# Patient Record
Sex: MALE | Race: WHITE | Employment: OTHER | URBAN - METROPOLITAN AREA
[De-identification: names, ages, dates, MRNs, and addresses within clinical notes are randomized per-mention and may not be internally consistent; named-entity substitution may affect disease eponyms.]

---

## 2018-08-04 ENCOUNTER — HOSPITAL ENCOUNTER (OUTPATIENT)
Dept: MRI IMAGING | Age: 78
Discharge: HOME | End: 2018-08-04
Attending: PHYSICAL MEDICINE & REHABILITATION
Payer: MEDICARE

## 2018-08-04 DIAGNOSIS — M47.22 CERVICAL SPONDYLOSIS WITH RADICULOPATHY: ICD-10-CM

## 2018-08-04 DIAGNOSIS — M43.12 SPONDYLOLISTHESIS OF CERVICAL REGION: ICD-10-CM

## 2018-08-04 PROCEDURE — 72141 MRI NECK SPINE W/O DYE: CPT

## 2018-08-20 ENCOUNTER — OFFICE VISIT (OUTPATIENT)
Dept: CARDIOLOGY CLINIC | Age: 78
End: 2018-08-20

## 2018-08-20 VITALS
WEIGHT: 172 LBS | OXYGEN SATURATION: 99 % | HEIGHT: 67 IN | BODY MASS INDEX: 27 KG/M2 | RESPIRATION RATE: 16 BRPM | SYSTOLIC BLOOD PRESSURE: 120 MMHG | DIASTOLIC BLOOD PRESSURE: 88 MMHG | HEART RATE: 62 BPM

## 2018-08-20 DIAGNOSIS — R07.9 CHEST PAIN, UNSPECIFIED TYPE: Primary | ICD-10-CM

## 2018-08-20 DIAGNOSIS — I10 ESSENTIAL HYPERTENSION: ICD-10-CM

## 2018-08-20 RX ORDER — GLUCOSAMINE/CHONDR SU A SOD 750-600 MG
1 TABLET ORAL DAILY
COMMUNITY

## 2018-08-20 RX ORDER — PSEUDOEPH/DM/GUAIFEN/ACETAMIN 30-10-324
1 EXPECTORANT ORAL DAILY
COMMUNITY

## 2018-08-20 NOTE — PROGRESS NOTES
Chief Complaint   Patient presents with    Chest Pain    New Patient     1. Have you been to the ER, urgent care clinic since your last visit? Hospitalized since your last visit? No    2. Have you seen or consulted any other health care providers outside of the [address] [place] [address] since your last visit? Include any pap smears or colon screening.  No    Visit Vitals    /88 (BP 1 Location: Left arm, BP Patient Position: Sitting)    Pulse 62    Resp 16    Ht 5' 7\" (1.702 m)    Wt 172 lb (78 kg)    SpO2 99%    BMI 26.94 kg/m2

## 2018-08-20 NOTE — MR AVS SNAPSHOT
[address] [facility] [address] [facility] 
[phone] Patient: Alcide Holstein MRN: [id number] FIN:[birth year] Visit Information Date & Time Provider Department Dept. Phone Encounter #  
 8/20/2018  3:40 PM Benita Frazier MD [facility] Jey Reid [phone] [id number] Upcoming Health Maintenance Date Due DTaP/Tdap/Td series (1 - Tdap) 6/5/1961 ZOSTER VACCINE AGE 60> 4/5/2000 GLAUCOMA SCREENING Q2Y 6/5/2005 Pneumococcal 65+ Low/Medium Risk (2 of 2 - PCV13) 3/21/2015 Influenza Age 5 to Adult 8/1/2018 Allergies as of 8/20/2018  Review Complete On: 8/20/2018 By: Star Vidal LPN No Known Allergies Current Immunizations  Reviewed on 8/20/2018 Name Date Pneumococcal Polysaccharide (PPSV-23) 3/21/2014  9:12 AM  
  
 Reviewed by Star Vidal LPN on 5/85/3340 at  3:30 PM  
You Were Diagnosed With   
  
 Codes Comments Chest pain, unspecified type    -  Primary ICD-10-CM: R07.9 ICD-9-CM: 786.50 Essential hypertension     ICD-10-CM: I10 
ICD-9-CM: 401.9 Vitals BP Pulse Resp Height(growth percentile) Weight(growth percentile) SpO2  
 120/88 (BP 1 Location: Left arm, BP Patient Position: Sitting) 62 16 5' 7\" (1.702 m) 172 lb (78 kg) 99% BMI Smoking Status 26.94 kg/m2 Never Smoker Vitals History BMI and BSA Data Body Mass Index Body Surface Area  
 26.94 kg/m 2 1.92 m 2 Preferred Pharmacy Pharmacy Name Phone Loretta Lopez [facility], [address] [phone] Your Updated Medication List  
  
   
This list is accurate as of 8/20/18  4:27 PM.  Always use your most recent med list.  
  
  
  
  
 aspirin 81 mg chewable tablet Take 81 mg by mouth daily. Biotin 2,500 mcg Cap Take 1 Tab by mouth daily. CO Q-10 100 mg capsule Generic drug:  co-enzyme Q-10  
 Take 100 mg by mouth daily. FISH OIL 1,000 mg Cap Generic drug:  omega-3 fatty acids-vitamin e Take 1 Cap by mouth daily. FLOMAX 0.4 mg capsule Generic drug:  tamsulosin Take 0.4 mg by mouth daily. HYZAAR 100-25 mg per tablet Generic drug:  losartan-hydroCHLOROthiazide Take 1 Tab by mouth daily. L-ARGININE(ALPHA-KETOGLUTARAT) 350 mg Denzel Price Generic drug:  arginine oxoglurate Take 1 Tab by mouth daily. lutein 20 mg Cap Take 1 Tab by mouth daily. MOVE FREE [facility] 750 mg-100 mg- 1.65 mg-108 mg Tab Generic drug:  glucosam-chond-hyalu-CF borate Take 1 Tab by mouth daily. multivits,Stress Formula-Zinc tablet Take 1 Tab by mouth daily. Omeprazole Magnesium 20 mg Cpdr  
Take 20 mg by mouth daily. OTHER Beta Carotine (unknown dose) daily, Terry Ginseng Root (unknown dose) daily, Saw Palmetto (unknown dose) one daily OTHER Take 1 Tab by mouth daily. Tumeric  
  
 pravastatin 40 mg tablet Commonly known as:  PRAVACHOL Take 40 mg by mouth nightly. PROSCAR 5 mg tablet Generic drug:  finasteride Take 5 mg by mouth daily. VITAMIN C 500 mg tablet Generic drug:  ascorbic acid (vitamin C) Take 500 mg by mouth daily. vitamin E 400 unit capsule Commonly known as:  [address] Take 400 Units by mouth daily. We Performed the Following AMB POC EKG ROUTINE W/ 12 LEADS, INTER & REP [23623 CPT(R)] Introducing [facility]! [employer] introduces WorthPoint patient portal. Now you can access parts of your medical record, email your doctor's office, and request medication refills online. 1. In your internet browser, go to [email or web address] 2. Click on the First Time User? Click Here link in the Sign In box. You will see the New Member Sign Up page. 3. Enter your WorthPoint Access Code exactly as it appears below.  You will not need to use this code after youve completed the sign-up process. If you do not sign up before the expiration date, you must request a new code. · Mobibeam Access Code: [email or web address] Expires: 10/18/2018  2:38 PM 
 
4. Enter the last four digits of your Social Security Number (xxxx) and Date of Birth (mm/dd/yyyy) as indicated and click Submit. You will be taken to the next sign-up page. 5. Create a Mobibeam ID. This will be your Mobibeam login ID and cannot be changed, so think of one that is secure and easy to remember. 6. Create a Mobibeam password. You can change your password at any time. 7. Enter your Password Reset Question and Answer. This can be used at a later time if you forget your password. 8. Enter your e-mail address. You will receive e-mail notification when new information is available in [address]. 9. Click Sign Up. You can now view and download portions of your medical record. 10. Click the Download Summary menu link to download a portable copy of your medical information. If you have questions, please visit the Frequently Asked Questions section of the Mobibeam website. Remember, Mobibeam is NOT to be used for urgent needs. For medical emergencies, dial 911. Now available from your iPhone and Android! Please provide this summary of care documentation to your next provider. Your primary care clinician is listed as Mayi Hitchcock. If you have any questions after today's visit, please call [phone].

## 2018-08-20 NOTE — PROGRESS NOTES
Rebekah Andrade, [address]  [address],  [address]  [place], [address] [facility]    Office [phone]  Fax [phone]  Cell [phone]    Mitzy German is a 66 y.o. male referred for evaluation of chest pain. Consult requested by Dr. Hernandez from the [place] (a patient of mine)    Assessment  Encounter Diagnoses   Name Primary?  Chest pain, unspecified type Yes    Essential hypertension        Recommendations:  Atypical chest pain. His sxs are more consistent with GERD. Nevertheless, he is at intermediate CAD risk. Will evaluate further with stress echo. Discussed the role of CT heart scan if his stress test is normal.    HTN. Controlled on Hyzaar. He looks much younger than his age, and leads a fairly vigorous lifestyle. Phone follow up after reviewing tests      Follow-up Disposition: Not on File     Subjective:  Mr. Pamela Huang has a history of chronic HTN, but no previous cardiac history otherwise. He reports a 3 week history of intermittent right-sided chest tightness, which usually occurs after he eats. He states pain lasts a few minutes, then resolves on its own. He enjoys biking and working out 3-4x/week, with no exertional symptoms. However, he had some SOB when dancing with his partner a few weeks ago. He has a history PUD and has been on PPI therapy. He also reports pain related to a pinched nerve. 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     Patient denies any dyspnea, palpitations, syncope, orthopnea, edema or paroxysmal nocturnal dyspnea.     Cardiac risk factors   HTN yes  DM no  Smoking yes    Cardiac testing  EKG 8/20/18 - SB 53, LVH, no significant change since 5/13/14  Echo 2014 - EF 60%, mild MR    Past Medical History:   Diagnosis Date    Arthritis     knees    Bleeding ulcer     BPH (benign prostatic hypertrophy)     Diverticulitis     removed with colon resection    High cholesterol     Hypertension     Other ill-defined conditions(799.89)     Enlarge prostate    PUD (peptic ulcer disease)     \"now healed\"        Current Outpatient Prescriptions   Medication Sig Dispense Refill    glucosam-chond-hyalu-CF borate (MOVE FREE [facility]) 750 mg-100 mg- 1.65 mg-108 mg tab Take 1 Tab by mouth daily.  Biotin 2,500 mcg cap Take 1 Tab by mouth daily.  lutein 20 mg cap Take 1 Tab by mouth daily.  OTHER Take 1 Tab by mouth daily. Tumeric      arginine oxoglurate (L-ARGININE,ALPHA-KETOGLUTARAT,) 350 mg TbER Take 1 Tab by mouth daily.  Omeprazole Magnesium 20 mg cpDR Take 20 mg by mouth daily.  aspirin 81 mg chewable tablet Take 81 mg by mouth daily.  multivits,Stress Formula-Zinc tablet Take 1 Tab by mouth daily.  vitamin E (AQUA GEMS) 400 unit capsule Take 400 Units by mouth daily.  ascorbic acid (VITAMIN C) 500 mg tablet Take 500 mg by mouth daily.  omega-3 fatty acids-vitamin e (FISH OIL) 1,000 mg cap Take 1 Cap by mouth daily.  co-enzyme Q-10 (CO Q-10) 100 mg capsule Take 100 mg by mouth daily.  finasteride (PROSCAR) 5 mg tablet Take 5 mg by mouth daily.  losartan-hydrochlorothiazide (HYZAAR) 100-25 mg per tablet Take 1 Tab by mouth daily.  pravastatin (PRAVACHOL) 40 mg tablet Take 40 mg by mouth nightly.  tamsulosin (FLOMAX) 0.4 mg capsule Take 0.4 mg by mouth daily.  OTHER Beta Carotine (unknown dose) daily, Terry Ginseng Root (unknown dose) daily, Saw Palmetto (unknown dose) one daily         No Known Allergies       Review of Systems  Constitutional: Negative for fever, chills, malaise/fatigue and diaphoresis. Respiratory: Negative for cough, hemoptysis, sputum production, shortness of breath and wheezing. Cardiovascular: Negative for chest pain, palpitations, orthopnea, claudication, leg swelling and PND. Gastrointestinal: Negative for heartburn, nausea, vomiting, blood in stool and melena.   Genitourinary: Negative for dysuria and flank pain.  Musculoskeletal: Negative for joint pain and back pain. Skin: Negative for rash. Neurological: Negative for focal weakness, seizures, loss of consciousness, weakness and headaches. Endo/Heme/Allergies: Does not bruise/bleed easily. Psychiatric/Behavioral: Negative for memory loss. The patient does not have insomnia. Physical Exam    Visit Vitals    /88 (BP 1 Location: Left arm, BP Patient Position: Sitting)    Pulse 62    Resp 16    Ht 5' 7\" (1.702 m)    Wt 172 lb (78 kg)    SpO2 99%    BMI 26.94 kg/m2     Wt Readings from Last 3 Encounters:   08/20/18 172 lb (78 kg)   06/04/14 169 lb (76.7 kg)   05/13/14 169 lb 1 oz (76.7 kg)      General - well developed well nourished  Neck - JVP normal, thyroid nl  Cardiac - normal S1, S2, no murmurs, rubs or gallops. No clicks  Vascular - carotids without bruits, radials, femorals and pedal pulses equal bilateral  Lungs - clear to auscultation bilaterals, no rales, wheezing or rhonchi  Abd - soft nontender, no HSM, no abd bruits  Extremities - no edema  Skin - no rash  Neuro - nonfocal  Psych - normal mood and affect      Cardiographics  EKG 8/20/18 - SB 53, LVH, no significant change since 5/13/14  Echo 2014 - EF 60%, mild MR      Written by Jes Oviedo, as dictated by Dr. Kolton Haque.    Kolton Haque MD

## 2018-10-26 ENCOUNTER — CLINICAL SUPPORT (OUTPATIENT)
Dept: CARDIOLOGY CLINIC | Age: 78
End: 2018-10-26

## 2018-10-26 DIAGNOSIS — R07.9 CHEST PAIN, UNSPECIFIED TYPE: Primary | ICD-10-CM

## 2018-10-26 DIAGNOSIS — I10 ESSENTIAL HYPERTENSION: ICD-10-CM

## 2018-10-26 NOTE — PROGRESS NOTES
See scanned report. Dr. Liban Espitia ordered study and Dr. Liban Espitia read study. ID verified per protocol. Explained procedure and risks to patient. All concerns and questions ansewered prior to obtaining consent test. Patient developed dyspnea during test. At 2 minutes in recovery, patient without symptoms or complaints voiced. Information given about CT heart scan.

## 2018-10-31 ENCOUNTER — TELEPHONE (OUTPATIENT)
Dept: CARDIOLOGY CLINIC | Age: 78
End: 2018-10-31

## 2018-10-31 NOTE — TELEPHONE ENCOUNTER
----- Message from Eagle Arboleda MD sent at 10/31/2018 11:26 AM EDT -----  Please let him know his stress test is normal. We discussed CT heart scan at his visit to evaluate for CAD. I will be happy to see him back as needed.

## 2018-10-31 NOTE — PROGRESS NOTES
Please let him know his stress test is normal. We discussed CT heart scan at his visit to evaluate for CAD. I will be happy to see him back as needed.

## 2018-11-06 ENCOUNTER — TELEPHONE (OUTPATIENT)
Dept: CARDIOLOGY CLINIC | Age: 78
End: 2018-11-06

## 2018-11-06 NOTE — TELEPHONE ENCOUNTER
----- Message from Walter Herron MD sent at 10/31/2018 11:26 AM EDT -----  Please let him know his stress test is normal. We discussed CT heart scan at his visit to evaluate for CAD. I will be happy to see him back as needed.

## 2019-03-22 ENCOUNTER — OFFICE VISIT (OUTPATIENT)
Dept: CARDIOLOGY CLINIC | Age: 79
End: 2019-03-22

## 2019-03-22 VITALS
SYSTOLIC BLOOD PRESSURE: 140 MMHG | BODY MASS INDEX: 28.56 KG/M2 | RESPIRATION RATE: 20 BRPM | HEART RATE: 54 BPM | HEIGHT: 67 IN | WEIGHT: 182 LBS | DIASTOLIC BLOOD PRESSURE: 80 MMHG | OXYGEN SATURATION: 99 %

## 2019-03-22 DIAGNOSIS — I48.0 PAROXYSMAL ATRIAL FIBRILLATION (HCC): ICD-10-CM

## 2019-03-22 DIAGNOSIS — K27.9 PEPTIC ULCER DISEASE: ICD-10-CM

## 2019-03-22 DIAGNOSIS — I10 ESSENTIAL HYPERTENSION: Primary | ICD-10-CM

## 2019-03-22 DIAGNOSIS — K57.92 DIVERTICULITIS: ICD-10-CM

## 2019-03-22 RX ORDER — ACETAMINOPHEN 325 MG/1
325 TABLET ORAL
COMMUNITY

## 2019-03-22 RX ORDER — LORATADINE 10 MG/1
10 TABLET ORAL
COMMUNITY

## 2019-03-22 RX ORDER — METOPROLOL TARTRATE 25 MG/1
12.5 TABLET, FILM COATED ORAL 2 TIMES DAILY
COMMUNITY

## 2019-03-22 RX ORDER — BACLOFEN 10 MG/1
10 TABLET ORAL AS NEEDED
COMMUNITY

## 2019-03-22 NOTE — PATIENT INSTRUCTIONS
Let's decrease your Metoprolol to 12.5 mg twice daily. Simply cut your current pills in half. Discontinue your baby Aspirin for now. We will re-visit this once you have your CT heart scan, but the Eliquis is the more important medication for now. Continue Eliquis 5 mg twice daily. I would like to have you repeat your CBC/blood counts in about 3 weeks time, before you come back to the office. If you were to see dark stool or red blood in your stool, stop your Eliquis and call us immediately. If you have any shortness of breath or fatigue or dizziness, this could also represent some loss of blood. We will perform an Echo when you return to see us again.

## 2019-03-22 NOTE — PROGRESS NOTES
Visit Vitals  /80   Pulse (!) 54   Resp 20   Ht 5' 7\" (1.702 m)   Wt 182 lb (82.6 kg)   SpO2 99%   BMI 28.51 kg/m²     EKG today

## 2019-03-22 NOTE — PROGRESS NOTES
[address]  [place], [address] [facility]    Office [phone]  Fax [phone]    Fallon Warner is a 66 y.o. male last seen by Dr. Louis Soriano 7 months ago; referred for evaluation of chest pain at that time by Dr. Lissa Cates from the [place]. Presenting today for further evaluation of abnormal EKG/new onset atrial fibrillation. Referred by Dr. Zev Haines. Assessment  Encounter Diagnoses   Name Primary?  Essential hypertension Yes    Peptic ulcer disease     Paroxysmal atrial fibrillation (HCC)     Diverticulitis      Recommendations:  1. New onset atrial fibrillation - by EKG at Dr. Beverly Jefferson office; unavailable at this time. SB by exam and EKG today. - Reduce Metoprolol to 12.5 mg BID in the setting of bradycardia. No s/sxs of recurrent AF or tachycardia. - Continue Eliquis 5 mg BID.  - Discontinue ASA with his Hx of bleeding ulcers in the setting of PUD, hx of diverticulosis with diverticulitis and colon resection. Close monitoring of CBC - repeat CBC again in 3 weeks. Long discussion about close monitoring for s/sxs of bleeding.   - Repeat Echo to evaluate EF, valves and atrial dimensions. - return to the office in 1 month to review labs, Echo, etc.   - Request EKG and recent lab work from [facility] the [facility] office for documentation. 2. Hx of atypical chest pain - normal Stress Echo 10/2018. Proceed with CT heart scan, as scheduled. -Advised that he hold ASA at this time. IF atherosclerosis is documented by CT heart scan,and he continues to tolerate anticoagulation as above, will re-discuss the role of ASA down the road. 3. HTN. Normotensive on current medication regimen.    - Continue Hyzaar 100/25 mg daily  - Monitor BP at home     Subjective:  Mr. Mayank Grigsby reports findings of afib by Dr. Zev Haines, his PCP, last week. EKG unavailable at this time. Dr. Zev Haines started Mr. Mayank Grigsby on Metoprolol 25 mg BID and Eliquis 5 mg BID.       Mr. Mayank Grigsby reports that his HR, when in afib, was in the 90's. Resting HR, prior to AF and prior to BB, was in 70's. Now resting HR in low 50's. Denies any fatigue or dizziness. He denies any episodes of tachycardia. Was able to feel palpitations when in afib. \"I'm in normal rhythm now\". He has a history PUD and has been on PPI therapy in the past. No current taking PPI therapy. No recent issues with GI bleeding. Has not seen GI since colonoscopy. Bleed occurred several months after the last colonoscopy. Presented with dark black stools. No fatigue or dyspnea occurred with blood loss. He denies any bleeding or bruising concerns currently, now compliant with Eliquis 5 mg BID and ASA 81 mg daily. He remains active at home. Denies any exertional chest pain or dyspnea. Sleeping well. No orthopnea, PND or edema. Routine lab work performed by Dr. Digna Au last week. Results not available at this time. 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    Cardiac testing  EKG 8/20/18 - SB 53, LVH, no significant change since 5/13/14  Echo 2014 - EF 60%, mild MR  Exercise Stress Echo 10/2018 - 7:00. Normal EF, no significant valvular abnormalities. Normal study. Past Medical History:   Diagnosis Date    Arthritis     knees    Bleeding ulcer     BPH (benign prostatic hypertrophy)     Diverticulitis     removed with colon resection    High cholesterol     Hypertension     Other ill-defined conditions(799.89)     Enlarge prostate    PUD (peptic ulcer disease)     \"now healed\"      Current Outpatient Medications   Medication Sig Dispense Refill    glucosam-chond-hyalu-CF borate (MOVE [facility]) 750 mg-100 mg- 1.65 mg-108 mg tab Take 1 Tab by mouth daily.  Biotin 2,500 mcg cap Take 1 Tab by mouth daily.  lutein 20 mg cap Take 1 Tab by mouth daily.  OTHER Take 1 Tab by mouth daily.  Tumeric      arginine oxoglurate (L-ARGININE,ALPHA-KETOGLUTARAT,) 350 mg TbER Take 1 Tab by mouth daily.  Omeprazole Magnesium 20 mg cpDR Take 20 mg by mouth daily.  aspirin 81 mg chewable tablet Take 81 mg by mouth daily.  multivits,Stress Formula-Zinc tablet Take 1 Tab by mouth daily.  vitamin E (AQUA GEMS) 400 unit capsule Take 400 Units by mouth daily.  ascorbic acid (VITAMIN C) 500 mg tablet Take 500 mg by mouth daily.  omega-3 fatty acids-vitamin e (FISH OIL) 1,000 mg cap Take 1 Cap by mouth daily.  co-enzyme Q-10 (CO Q-10) 100 mg capsule Take 100 mg by mouth daily.  finasteride (PROSCAR) 5 mg tablet Take 5 mg by mouth daily.  losartan-hydrochlorothiazide (HYZAAR) 100-25 mg per tablet Take 1 Tab by mouth daily.  pravastatin (PRAVACHOL) 40 mg tablet Take 40 mg by mouth nightly.  tamsulosin (FLOMAX) 0.4 mg capsule Take 0.4 mg by mouth daily.  OTHER Beta Carotine (unknown dose) daily, Terry Ginseng Root (unknown dose) daily, Saw Palmetto (unknown dose) one daily         No Known Allergies     Review of Systems  Constitutional: Negative for fever, chills, malaise/fatigue and diaphoresis. Respiratory: Negative for cough, hemoptysis, sputum production, shortness of breath and wheezing. Cardiovascular: Negative for chest pain, palpitations, orthopnea, claudication, leg swelling and PND. Gastrointestinal: Negative for heartburn, nausea, vomiting, blood in stool and melena. Genitourinary: Negative for dysuria and flank pain. Musculoskeletal: Negative for joint pain and back pain. Skin: Negative for rash. Neurological: Negative for focal weakness, seizures, loss of consciousness, weakness and headaches. Endo/Heme/Allergies: Does not bruise/bleed easily. Psychiatric/Behavioral: Negative for memory loss. The patient does not have insomnia.     Physical Exam  Visit Vitals  /80   Pulse (!) 54   Resp 20   Ht 5' 7\" (1.702 m)   Wt 182 lb (82.6 kg)   SpO2 99%   BMI 28.51 kg/m²     Wt Readings from Last 3 Encounters:   03/22/19 182 lb (82.6 kg)   08/20/18 172 lb (78 kg)   06/04/14 169 lb (76.7 kg)      General - well developed well nourished  Neck - JVP normal, thyroid nl  Cardiac - normal S1, S2, no murmurs, rubs or gallops.  No clicks  Vascular - carotids without bruits, radials, femorals and pedal pulses equal bilateral  Lungs - clear to auscultation bilaterals, no rales, wheezing or rhonchi  Abd - soft nontender, no HSM, no abd bruits  Extremities - no edema  Skin - no rash  Neuro - nonfocal  Psych - normal mood and affect    Cardiographics  EKG 8/20/18 - SB 53, LVH, no significant change since 5/13/14  EKG 3/2/19 - SB 51, LVH,     Deepti Zayas, NP

## 2019-03-25 ENCOUNTER — HOSPITAL ENCOUNTER (OUTPATIENT)
Dept: CT IMAGING | Age: 79
Discharge: HOME | End: 2019-03-25
Payer: SELF-PAY

## 2019-03-25 DIAGNOSIS — Z00.00 PREVENTATIVE HEALTH CARE: ICD-10-CM

## 2019-03-25 PROCEDURE — 75571 CT HRT W/O DYE W/CA TEST: CPT

## 2019-03-30 NOTE — CARDIO/PULMONARY
Reached patient at his given home number and shared his coronary artery CT score of 235 with him. I also shared that I have communicated about this with Keron Ayala, LORRAINE, and her office will be discussing this with him further at his next visit. We discussed the meaning of this score. Patient has no further questions at this time.

## 2019-08-02 ENCOUNTER — OFFICE VISIT (OUTPATIENT)
Dept: CARDIOLOGY CLINIC | Age: 79
End: 2019-08-02

## 2019-08-02 VITALS — WEIGHT: 173 LBS | DIASTOLIC BLOOD PRESSURE: 72 MMHG | SYSTOLIC BLOOD PRESSURE: 120 MMHG | BODY MASS INDEX: 27.1 KG/M2

## 2019-08-02 DIAGNOSIS — I48.0 PAROXYSMAL ATRIAL FIBRILLATION (HCC): Primary | ICD-10-CM

## 2019-08-02 DIAGNOSIS — E78.5 DYSLIPIDEMIA: ICD-10-CM

## 2019-08-02 DIAGNOSIS — R93.1 AGATSTON CAC SCORE 200-399: ICD-10-CM

## 2019-08-02 DIAGNOSIS — I10 ESSENTIAL HYPERTENSION: ICD-10-CM

## 2019-08-02 RX ORDER — CHOLECALCIFEROL (VITAMIN D3) 125 MCG
CAPSULE ORAL
COMMUNITY

## 2019-08-02 RX ORDER — EZETIMIBE 10 MG/1
10 TABLET ORAL DAILY
Qty: 90 TAB | Refills: 3 | Status: SHIPPED | OUTPATIENT
Start: 2019-08-02 | End: 2020-06-24

## 2019-08-02 NOTE — PATIENT INSTRUCTIONS
-Resume aspirin 81mg daily 
-Start Zetia 20mg daily 
-Have cholesterol rechecked with Celia Quinn MD in 3 months

## 2019-08-02 NOTE — LETTER
8/12/19 Patient: Sourav Huitron YOB: 1940 Date of Visit: 8/2/2019 Aurelio Stokes MD 
[address] [address] VIA Facsimile: [phone] Dear Aurelio Stokes MD, Thank you for referring  April Marrow to [address] for evaluation. My notes for this consultation are attached. If you have questions, please do not hesitate to call me. I look forward to following your patient along with you. Sincerely, Garrett Duggan MD

## 2019-08-02 NOTE — PROGRESS NOTES
Rebekah RIVAS [address], [address]  [address],  [address]  [place], [address] [facility]    Office [phone]  Fax [phone]  Cell [phone]    Moraima Soler is a 78 y.o. male last seen by Ellen Banks NP 4 months ago. Assessment  Encounter Diagnoses   Name Primary?  Paroxysmal atrial fibrillation (HCC) Yes    Essential hypertension     Dyslipidemia     Agatston CAC score 200-399      Recommendations:    Paroxysmal AF, initially diagnosed 3.2019. He has intermit tem palpitations that are more suggestive of PACs or PVCs occurring several times a day. We discussed  30 day event monitor - will hold off for beto unless his sxs become mor prolonged. by EKG at Dr. Unger Credit office; unavailable at this time. SB by exam and EKG today. Echo today demonstrates normal LV function, valves, and atria. - Continue Metoprolol  12.5 mg BID I  - Continue Eliquis 5 mg BID. CAD by CT heart scan 3/219 with . Stress echo 10/218 was normal. He has no sxs of angina at a good functional capacity.  - Resume ASA 81mg/d. He has not had any digestive sxs in quite some time. - Lipids from 1/2019 dem  on Prava 40mg/d Add Zetia 10mg/d. We also discussed a higher potency statin such as Crestor. He prefers Zetia at this time. Recheck lipids with Gilford Pate, MD in 6 months. HTN. normotensive on combination therapy    Of note, he and his wife live in [place]. Subjective:    Moraima Soler reports he has been not bad. He has been experiencing intermittent palpitations for the past 2 days, lasting more than a few minutes. He says it happened after eating and the dancing. Otherwise, he feels well with no exertional sxs. Patient denies any exertional chest pain, dyspnea, syncope, orthopnea, edema or paroxysmal nocturnal dyspnea. He has no bleeding issues on Eliquis. His home BP has been normal.     He reports cutting a lot of junk out of his diet.  He denies any ongoing gastro issues. He drinks a lot of caffeine from tea, and he drinks alcohol socially. His wife is present and contributing to hx. 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    Cardiac testing  EKG 8/20/18 - SB 53, LVH, no significant change since 5/13/14  Echo 2014 - EF 60%, mild MR  Exercise Stress Echo 10/2018 - 7:00. Normal EF, no significant valvular abnormalities. Normal study. CT Heart scan 3/25/19 - Calcium score 235    Echo 8/6/19 -  EF 61 - 65%. No WMA Mild aortic valve sclerosis with no significant stenosis. Mild MR. Mild ascending aorta dilatation. Past Medical History:   Diagnosis Date    Arthritis     knees    Bleeding ulcer     BPH (benign prostatic hypertrophy)     Diverticulitis     removed with colon resection    High cholesterol     Hypertension     Other ill-defined conditions(799.89)     Enlarge prostate    PUD (peptic ulcer disease)     \"now healed\"      Current Outpatient Medications   Medication Sig Dispense Refill    ezetimibe (ZETIA) 10 mg tablet Take 1 Tab by mouth daily. Indications: high cholesterol 90 Tab 3    metoprolol tartrate (LOPRESSOR) 25 mg tablet Take 12.5 mg by mouth two (2) times a day.  apixaban (ELIQUIS) 5 mg (74 tabs) starter pack Take  by mouth. Take 10 mg (two 5 mg tablets) by mouth twice a day for 7 days   Followed by 5 mg (one 5 mg tablet) by mouth twice a day      loratadine (ALLERCLEAR) 10 mg tablet Take 10 mg by mouth.  acetaminophen (TYLENOL) 325 mg tablet Take 325 mg by mouth every four (4) hours as needed for Pain (2 IN am AND 1 AT NIGHT DAILY).  baclofen (LIORESAL) 10 mg tablet Take 10 mg by mouth as needed.  glucosam-chond-hyalu-CF borate (MOVE FREE ALOSKO) 750 mg-100 mg- 1.65 mg-108 mg tab Take 1 Tab by mouth daily.  Biotin 2,500 mcg cap Take 1 Tab by mouth daily.  lutein 20 mg cap Take 1 Tab by mouth daily.       OTHER Take 1 Tab by mouth daily. Tumeric      Omeprazole Magnesium 20 mg cpDR Take 20 mg by mouth daily.  vitamin E (AQUA GEMS) 400 unit capsule Take 400 Units by mouth daily.  ascorbic acid (VITAMIN C) 500 mg tablet Take 500 mg by mouth daily.  omega-3 fatty acids-vitamin e (FISH OIL) 1,000 mg cap Take 1 Cap by mouth daily.  co-enzyme Q-10 (CO Q-10) 100 mg capsule Take 100 mg by mouth daily.  finasteride (PROSCAR) 5 mg tablet Take 5 mg by mouth daily.  losartan-hydrochlorothiazide (HYZAAR) 100-25 mg per tablet Take 1 Tab by mouth daily.  pravastatin (PRAVACHOL) 40 mg tablet Take 40 mg by mouth nightly.  tamsulosin (FLOMAX) 0.4 mg capsule Take 0.4 mg by mouth daily.  OTHER Beta Carotine (unknown dose) daily, Terry Ginseng Root (unknown dose) daily, Saw Palmetto (unknown dose) one daily      cholecalciferol, vitamin D3, 2,000 unit tab Take  by mouth.  multivits,Stress Formula-Zinc tablet Take 1 Tab by mouth daily. No Known Allergies     Review of Systems  Constitutional: Negative for fever, chills, malaise/fatigue and diaphoresis. Respiratory: Negative for cough, hemoptysis, sputum production, shortness of breath and wheezing. Cardiovascular: Negative for chest pain, orthopnea, claudication, leg swelling and PND. +palpitations,  Gastrointestinal: Negative for heartburn, nausea, vomiting, blood in stool and melena. Genitourinary: Negative for dysuria and flank pain. Musculoskeletal: Negative for joint pain and back pain. Skin: Negative for rash. Neurological: Negative for focal weakness, seizures, loss of consciousness, weakness and headaches. Endo/Heme/Allergies: Does not bruise/bleed easily. Psychiatric/Behavioral: Negative for memory loss. The patient does not have insomnia.     Physical Exam  Visit Vitals  /72   Wt 173 lb (78.5 kg)   BMI 27.10 kg/m²     Wt Readings from Last 3 Encounters:   08/02/19 173 lb (78.5 kg)   08/02/19 173 lb (78.5 kg) 03/22/19 182 lb (82.6 kg)      General - well developed well nourished  Neck - JVP normal, thyroid nl  Cardiac - normal S1, S2, no murmurs, rubs or gallops.  No clicks  Vascular - carotids without bruits, radials, femorals and pedal pulses equal bilateral  Lungs - clear to auscultation bilaterals, no rales, wheezing or rhonchi  Abd - soft nontender, no HSM, no abd bruits  Extremities - no edema  Skin - no rash  Neuro - nonfocal  Psych - normal mood and affect    Cardiographics  EKG 8/20/18 - SB 53, LVH, no significant change since 5/13/14  EKG 3/2/19 - SB 51, LVH  Echo 8/2/19 - EF 60%, normal atrial dimenstions    Marshal Croft MD

## 2019-09-24 ENCOUNTER — COMPLETE SKIN EXAM (OUTPATIENT)
Dept: URBAN - NONMETROPOLITAN AREA CLINIC 7 | Facility: CLINIC | Age: 79
Setting detail: DERMATOLOGY
End: 2019-09-24

## 2019-09-24 DIAGNOSIS — C44.519 BASAL CELL CARCINOMA OF SKIN OF OTHER PART OF TRUNK: ICD-10-CM

## 2019-09-24 PROCEDURE — 17003 DESTRUCT PREMALG LES 2-14: CPT

## 2019-09-24 PROCEDURE — 17110 DESTRUCT B9 LESION 1-14: CPT

## 2019-09-24 PROCEDURE — 17000 DESTRUCT PREMALG LESION: CPT

## 2019-09-24 PROCEDURE — 11102 TANGNTL BX SKIN SINGLE LES: CPT

## 2019-09-24 PROCEDURE — 99203 OFFICE O/P NEW LOW 30 MIN: CPT

## 2020-06-07 ENCOUNTER — APPOINTMENT (OUTPATIENT)
Dept: GENERAL RADIOLOGY | Age: 80
End: 2020-06-07
Attending: EMERGENCY MEDICINE
Payer: MEDICARE

## 2020-06-07 ENCOUNTER — HOSPITAL ENCOUNTER (EMERGENCY)
Age: 80
Discharge: HOME | End: 2020-06-07
Attending: EMERGENCY MEDICINE | Admitting: EMERGENCY MEDICINE
Payer: MEDICARE

## 2020-06-07 VITALS
HEART RATE: 47 BPM | BODY MASS INDEX: 26.37 KG/M2 | RESPIRATION RATE: 16 BRPM | TEMPERATURE: 97.7 F | DIASTOLIC BLOOD PRESSURE: 79 MMHG | OXYGEN SATURATION: 100 % | WEIGHT: 168 LBS | HEIGHT: 67 IN | SYSTOLIC BLOOD PRESSURE: 151 MMHG

## 2020-06-07 DIAGNOSIS — R07.89 OTHER CHEST PAIN: Primary | ICD-10-CM

## 2020-06-07 LAB
ALBUMIN SERPL-MCNC: 3.7 G/DL (ref 3.5–5)
ALBUMIN/GLOB SERPL: 1.1 {RATIO} (ref 1.1–2.2)
ALP SERPL-CCNC: 79 U/L (ref 45–117)
ALT SERPL-CCNC: 27 U/L (ref 12–78)
ANION GAP SERPL CALC-SCNC: 5 MMOL/L (ref 5–15)
AST SERPL-CCNC: 22 U/L (ref 15–37)
BASOPHILS # BLD: 0 K/UL (ref 0–0.1)
BASOPHILS NFR BLD: 0 % (ref 0–1)
BILIRUB SERPL-MCNC: 0.3 MG/DL (ref 0.2–1)
BUN SERPL-MCNC: 25 MG/DL (ref 6–20)
BUN/CREAT SERPL: 17 (ref 12–20)
CALCIUM SERPL-MCNC: 8.8 MG/DL (ref 8.5–10.1)
CHLORIDE SERPL-SCNC: 103 MMOL/L (ref 97–108)
CO2 SERPL-SCNC: 29 MMOL/L (ref 21–32)
COMMENT, HOLDF: NORMAL
CREAT SERPL-MCNC: 1.48 MG/DL (ref 0.7–1.3)
DIFFERENTIAL METHOD BLD: ABNORMAL
EOSINOPHIL # BLD: 0.3 K/UL (ref 0–0.4)
EOSINOPHIL NFR BLD: 5 % (ref 0–7)
ERYTHROCYTE [DISTWIDTH] IN BLOOD BY AUTOMATED COUNT: 12.8 % (ref 11.5–14.5)
GLOBULIN SER CALC-MCNC: 3.3 G/DL (ref 2–4)
GLUCOSE SERPL-MCNC: 95 MG/DL (ref 65–100)
HCT VFR BLD AUTO: 37.1 % (ref 36.6–50.3)
HGB BLD-MCNC: 12.1 G/DL (ref 12.1–17)
IMM GRANULOCYTES # BLD AUTO: 0 K/UL (ref 0–0.04)
IMM GRANULOCYTES NFR BLD AUTO: 0 % (ref 0–0.5)
LYMPHOCYTES # BLD: 1.3 K/UL (ref 0.8–3.5)
LYMPHOCYTES NFR BLD: 21 % (ref 12–49)
MCH RBC QN AUTO: 30.7 PG (ref 26–34)
MCHC RBC AUTO-ENTMCNC: 32.6 G/DL (ref 30–36.5)
MCV RBC AUTO: 94.2 FL (ref 80–99)
MONOCYTES # BLD: 0.6 K/UL (ref 0–1)
MONOCYTES NFR BLD: 9 % (ref 5–13)
NEUTS SEG # BLD: 4.2 K/UL (ref 1.8–8)
NEUTS SEG NFR BLD: 65 % (ref 32–75)
NRBC # BLD: 0 K/UL (ref 0–0.01)
NRBC BLD-RTO: 0 PER 100 WBC
PLATELET # BLD AUTO: 212 K/UL (ref 150–400)
PMV BLD AUTO: 10.3 FL (ref 8.9–12.9)
POTASSIUM SERPL-SCNC: 4.1 MMOL/L (ref 3.5–5.1)
PROT SERPL-MCNC: 7 G/DL (ref 6.4–8.2)
RBC # BLD AUTO: 3.94 M/UL (ref 4.1–5.7)
SAMPLES BEING HELD,HOLD: NORMAL
SODIUM SERPL-SCNC: 137 MMOL/L (ref 136–145)
TROPONIN I SERPL-MCNC: <0.05 NG/ML
WBC # BLD AUTO: 6.4 K/UL (ref 4.1–11.1)

## 2020-06-07 PROCEDURE — 85025 COMPLETE CBC W/AUTO DIFF WBC: CPT

## 2020-06-07 PROCEDURE — 74011250637 HC RX REV CODE- 250/637: Performed by: EMERGENCY MEDICINE

## 2020-06-07 PROCEDURE — 36415 COLL VENOUS BLD VENIPUNCTURE: CPT

## 2020-06-07 PROCEDURE — 93005 ELECTROCARDIOGRAM TRACING: CPT

## 2020-06-07 PROCEDURE — 99284 EMERGENCY DEPT VISIT MOD MDM: CPT

## 2020-06-07 PROCEDURE — 84484 ASSAY OF TROPONIN QUANT: CPT

## 2020-06-07 PROCEDURE — 74011000250 HC RX REV CODE- 250: Performed by: EMERGENCY MEDICINE

## 2020-06-07 PROCEDURE — 80053 COMPREHEN METABOLIC PANEL: CPT

## 2020-06-07 PROCEDURE — 71046 X-RAY EXAM CHEST 2 VIEWS: CPT

## 2020-06-07 RX ORDER — GUAIFENESIN 100 MG/5ML
325 LIQUID (ML) ORAL
Status: COMPLETED | OUTPATIENT
Start: 2020-06-07 | End: 2020-06-07

## 2020-06-07 RX ADMIN — ASPIRIN 81 MG 324 MG: 81 TABLET ORAL at 22:21

## 2020-06-07 RX ADMIN — LIDOCAINE HYDROCHLORIDE 40 ML: 20 SOLUTION ORAL; TOPICAL at 22:21

## 2020-06-08 ENCOUNTER — PATIENT OUTREACH (OUTPATIENT)
Dept: INTERNAL MEDICINE CLINIC | Age: 80
End: 2020-06-08

## 2020-06-08 ENCOUNTER — TELEPHONE (OUTPATIENT)
Dept: CARDIOLOGY CLINIC | Age: 80
End: 2020-06-08

## 2020-06-08 LAB
ATRIAL RATE: 54 BPM
CALCULATED P AXIS, ECG09: 67 DEGREES
CALCULATED R AXIS, ECG10: 60 DEGREES
CALCULATED T AXIS, ECG11: 59 DEGREES
DIAGNOSIS, 93000: NORMAL
P-R INTERVAL, ECG05: 186 MS
Q-T INTERVAL, ECG07: 418 MS
QRS DURATION, ECG06: 92 MS
QTC CALCULATION (BEZET), ECG08: 396 MS
VENTRICULAR RATE, ECG03: 54 BPM

## 2020-06-08 NOTE — DISCHARGE INSTRUCTIONS
Patient Education   Patient Education        Gastroesophageal Reflux Disease (GERD): Care Instructions  Your Care Instructions     Gastroesophageal reflux disease (GERD) is the backward flow of stomach acid into the esophagus. The esophagus is the tube that leads from your throat to your stomach. A one-way valve prevents the stomach acid from backing up into this tube. When you have GERD, this valve does not close tightly enough. This can also cause pain and swelling in your esophagus (esophagitis). If you have mild GERD symptoms including heartburn, you may be able to control the problem with antacids or over-the-counter medicine. Changing your diet and eating habits, such as not eating late at night, losing weight, and making other lifestyle changes can also help reduce symptoms. Follow-up care is a key part of your treatment and safety. Be sure to make and go to all appointments, and call your doctor if you are having problems. It's also a good idea to know your test results and keep a list of the medicines you take. How can you care for yourself at home? · Take your medicines exactly as prescribed. Call your doctor if you think you are having a problem with your medicine. · Your doctor may recommend over-the-counter medicine. For mild or occasional indigestion, antacids, such as Tums, Gaviscon, Mylanta, or Maalox, may help. Your doctor also may recommend over-the-counter acid reducers, such as Pepcid AC (famotidine), Tagamet HB (cimetidine), or Prilosec (omeprazole). Read and follow all instructions on the label. If you use these medicines often, talk with your doctor. · Change your eating habits. ? It's best to eat several small meals instead of two or three large meals. ? After you eat, wait 2 to 3 hours before you lie down. ? Chocolate, mint, and alcohol can make GERD worse. ?  Spicy foods, foods that have a lot of acid (like tomatoes and oranges), and coffee can make GERD symptoms worse in some people. If your symptoms are worse after you eat a certain food, you may want to stop eating that food to see if your symptoms get better. · Do not smoke or chew tobacco. Smoking can make GERD worse. If you need help quitting, talk to your doctor about stop-smoking programs and medicines. These can increase your chances of quitting for good. · If you have GERD symptoms at night, raise the head of your bed 6 to 8 inches by putting the frame on blocks or placing a foam wedge under the head of your mattress. (Adding extra pillows does not work.)  · Do not wear tight clothing around your middle. · Lose weight if you need to. Losing just 5 to 10 pounds can help. When should you call for help? Call your doctor now or seek immediate medical care if:  · You have new or different belly pain. · Your stools are black and tarlike or have streaks of blood. Watch closely for changes in your health, and be sure to contact your doctor if:  · Your symptoms have not improved after 2 days. · Food seems to catch in your throat or chest.  Where can you learn more? Go to http://aliyah-hattie.info/  Enter C931 in the search box to learn more about \"Gastroesophageal Reflux Disease (GERD): Care Instructions. \"  Current as of: August 12, 2019               Content Version: 12.5  © 4133-1098 Healthwise, Incorporated. Care instructions adapted under license by [employer] (which disclaims liability or warranty for this information). If you have questions about a medical condition or this instruction, always ask your healthcare professional. [address] any warranty or liability for your use of this information. Chest Pain: Care Instructions  Your Care Instructions     There are many things that can cause chest pain. Some are not serious and will get better on their own in a few days. But some kinds of chest pain need more testing and treatment.  Your doctor may have recommended a follow-up visit in the next 8 to 12 hours. If you are not getting better, you may need more tests or treatment. Even though your doctor has released you, you still need to watch for any problems. The doctor carefully checked you, but sometimes problems can develop later. If you have new symptoms or if your symptoms do not get better, get medical care right away. If you have worse or different chest pain or pressure that lasts more than 5 minutes or you passed out (lost consciousness), [id number] or seek other emergency help right away. A medical visit is only one step in your treatment. Even if you feel better, you still need to do what your doctor recommends, such as going to all suggested follow-up appointments and taking medicines exactly as directed. This will help you recover and help prevent future problems. How can you care for yourself at home? · Rest until you feel better. · Take your medicine exactly as prescribed. Call your doctor if you think you are having a problem with your medicine. · Do not drive after taking a prescription pain medicine. When should you call for help? [id number]:   · You passed out (lost consciousness). · You have severe difficulty breathing. · You have symptoms of a heart attack. These may include:  ? Chest pain or pressure, or a strange feeling in your chest.  ? Sweating. ? Shortness of breath. ? Nausea or vomiting. ? Pain, pressure, or a strange feeling in your back, neck, jaw, or upper belly or in one or both shoulders or arms. ? Lightheadedness or sudden weakness. ? A fast or irregular heartbeat. After you call 911, the  may tell you to chew 1 adult-strength or 2 to 4 low-dose aspirin. Wait for an ambulance. Do not try to drive yourself. Call your doctor today if:   · You have any trouble breathing. · Your chest pain gets worse. · You are dizzy or lightheaded, or you feel like you may faint.   · You are not getting better as expected. · You are having new or different chest pain. Where can you learn more? Go to http://aliyah-hattie.info/  Enter A120 in the search box to learn more about \"Chest Pain: Care Instructions. \"  Current as of: June 26, 2019               Content Version: 12.5  © 6385-2467 Healthwise, [employer]. Care instructions adapted under license by [employer] (which disclaims liability or warranty for this information). If you have questions about a medical condition or this instruction, always ask your healthcare professional. [address] any warranty or liability for your use of this information.

## 2020-06-08 NOTE — ED PROVIDER NOTES
Please note that this dictation was completed with [employer], the computer voice recognition software.  Quite often unanticipated grammatical, syntax, homophones, and other interpretive errors are inadvertently transcribed by the computer software.  Please disregard these errors.  Please excuse any errors that have escaped final proofreading. 20-year-old white male past medical history markable for bilateral knee arthritis, bleeding ulcer, BPH, diverticulitis, high cholesterol, hypertension, enlarged prostate, peptic ulcer disease who sees Dr. Steve Nix as his cardiologist presents the ER complaining of \"ate some barbecue yesterday afternoon about 4-5 about 6 PM started having chest pain. Since then he been having intermittent chest pain, bothered me all through the night. Patient denies breaking into sweats shortness of breath nausea vomiting diarrhea. Patient states he has been taking his medications as normal he just thought \"better come get checked out 1 to make sure it was not my heart. \". Patient has no further concerns at this time. pt denies HA, vison changes, diff swallowing, SOB, Abd pain, F/Ch, N/V, D/Cons or other current systemic complaints    Social/ PSH reviewed in EMR    EMR Chart Reviewed        The history is provided by the patient. Past Medical History:   Diagnosis Date    Arthritis     knees    Bleeding ulcer     BPH (benign prostatic hypertrophy)     Diverticulitis     removed with colon resection    High cholesterol     Hypertension     Other ill-defined conditions(799.89)     Enlarge prostate    PUD (peptic ulcer disease)     \"now healed\"       Past Surgical History:   Procedure Laterality Date    HX APPENDECTOMY      HX COLOSTOMY      HX HERNIA REPAIR      umbilical         History reviewed. No pertinent family history.     Social History     Socioeconomic History    Marital status: SINGLE     Spouse name: Not on file    Number of children: Not on file    Years of education: Not on file    Highest education level: Not on file   Occupational History    Not on file   Social Needs    Financial resource strain: Not on file    Food insecurity     Worry: Not on file     Inability: Not on file    Transportation needs     Medical: Not on file     Non-medical: Not on file   Tobacco Use    Smoking status: Never Smoker    Smokeless tobacco: Never Used   Substance and Sexual Activity    Alcohol use: Yes     Alcohol/week: 2.5 standard drinks     Types: 3 Cans of beer per week     Comment: beer or liqour on weekends. 3-4    Drug use: No    Sexual activity: Not on file   Lifestyle    Physical activity     Days per week: Not on file     Minutes per session: Not on file    Stress: Not on file   Relationships    Social connections     Talks on phone: Not on file     Gets together: Not on file     Attends Yazidi service: Not on file     Active member of club or organization: Not on file     Attends meetings of clubs or organizations: Not on file     Relationship status: Not on file    Intimate partner violence     Fear of current or ex partner: Not on file     Emotionally abused: Not on file     Physically abused: Not on file     Forced sexual activity: Not on file   Other Topics Concern    Not on file   Social History Narrative    Not on file         ALLERGIES: Patient has no known allergies. Review of Systems   Constitutional: Negative for chills and fever. HENT: Negative for drooling, trouble swallowing and voice change. Eyes: Negative for photophobia and pain. Respiratory: Positive for chest tightness. Negative for cough, choking and shortness of breath. Cardiovascular: Positive for chest pain. Negative for palpitations and leg swelling. Gastrointestinal: Negative for abdominal pain, nausea and vomiting. Genitourinary: Negative for dysuria. Musculoskeletal: Negative for back pain. Skin: Negative for rash. Neurological: Negative for facial asymmetry. All other systems reviewed and are negative. Vitals:    06/07/20 2004 06/07/20 2211 06/07/20 2224 06/07/20 2230   BP: 140/77   143/83   Pulse: (!) 56      Resp: 16      Temp: 98.4 °F (36.9 °C)      SpO2: 98% 98% 100% 100%   Weight: 76.2 kg (168 lb)      Height: 5' 7\" (1.702 m)               Physical Exam  Vitals signs and nursing note reviewed. Constitutional:       General: He is not in acute distress. Appearance: Normal appearance. He is well-developed. He is not ill-appearing, toxic-appearing or diaphoretic. Comments: NAD, AxOx4, speaking in complete sentences     HENT:      Head: Normocephalic and atraumatic. Right Ear: External ear normal.      Left Ear: External ear normal.      Mouth/Throat:      Pharynx: No oropharyngeal exudate. Eyes:      General:         Right eye: No discharge. Left eye: No discharge. Conjunctiva/sclera: Conjunctivae normal.      Pupils: Pupils are equal, round, and reactive to light. Neck:      Musculoskeletal: Normal range of motion and neck supple. No neck rigidity. Cardiovascular:      Rate and Rhythm: Normal rate and regular rhythm. Pulses: Normal pulses. Heart sounds: Normal heart sounds. No murmur. No friction rub. No gallop. Pulmonary:      Effort: Pulmonary effort is normal. No respiratory distress. Breath sounds: Normal breath sounds. No wheezing or rales. Chest:      Chest wall: No tenderness. Abdominal:      General: Bowel sounds are normal. There is no distension. Palpations: Abdomen is soft. There is no mass. Tenderness: There is no abdominal tenderness. There is no guarding or rebound. Genitourinary:     Comments: Pt denies urinary/ Testicular/ scrotal or penile  complaints  Musculoskeletal: Normal range of motion. General: No swelling, tenderness, deformity or signs of injury. Right lower leg: No edema. Left lower leg: No edema.    Lymphadenopathy:      Cervical: No cervical adenopathy. Skin:     General: Skin is warm and dry. Capillary Refill: Capillary refill takes less than 2 seconds. Coloration: Skin is not jaundiced. Findings: No bruising, erythema, lesion or rash. Neurological:      General: No focal deficit present. Mental Status: He is alert and oriented to person, place, and time. Cranial Nerves: No cranial nerve deficit. Sensory: No sensory deficit. Motor: No weakness. Gait: Gait normal.      Deep Tendon Reflexes: Reflexes normal.      Comments: pt has motor/ CV/ Sensation grossly intact to all extremities, R = L in strength;          MDM       Procedures    Chief Complaint   Patient presents with    Chest Pain       10:48 PM  The patients presenting problems have been discussed, and they are in agreement with the care plan formulated and outlined with them. I have encouraged them to ask questions as they arise throughout their visit. MEDICATIONS GIVEN:  Medications   mylanta/viscous lidocaine (GI COCKTAIL) (40 mL Oral Given 6/7/20 2221)   aspirin chewable tablet 324 mg (324 mg Oral Given 6/7/20 2221)       LABS REVIEWED:  Labs Reviewed   CBC WITH AUTOMATED DIFF - Abnormal; Notable for the following components:       Result Value    RBC 3.94 (*)     All other components within normal limits   METABOLIC PANEL, COMPREHENSIVE - Abnormal; Notable for the following components:    BUN 25 (*)     Creatinine 1.48 (*)     GFR est AA 55 (*)     GFR est non-AA 46 (*)     All other components within normal limits   SAMPLES BEING HELD   TROPONIN I       RADIOLOGY RESULTS:  The following have been ordered and reviewed:  _____________________________________________________________________  _____________________________________________________________________    EKG interpretation:   Rhythm:  sinus bradycardia rhythm; and regular .  Rate (approx.): 54; Axis: normal; P wave: normal; QRS interval: normal ; ST/T wave: normal; Negative acute significant segmental elevations/ unchanged compared to study dated 03/22/2019  PROCEDURES:        CONSULTATIONS:       PROGRESS NOTES:      DIAGNOSIS:    1. Other chest pain        PLAN:  1-Chest Pain / neg ed evaluation - will have pt follow-up with Cardiology;         ED COURSE: The patients hospital course has been uncomplicated. 10:51 PM  Yoko Roberts's  results have been reviewed with him. He has been counseled regarding his diagnosis. He verbally conveys understanding and agreement of the signs, symptoms, diagnosis, treatment and prognosis and additionally agrees to Call/ Arrange follow up as recommended with Dr. Riley Duggan MD in 24 - 48 hours. He also agrees with the care-plan and conveys that all of his questions have been answered. I have also put together some discharge instructions for him that include: 1) educational information regarding their diagnosis, 2) how to care for their diagnosis at home, as well a 3) list of reasons why they would want to return to the ED prior to their follow-up appointment, should their condition change or for concerns.

## 2020-06-08 NOTE — TELEPHONE ENCOUNTER
Patient would like to schedule an appointment with Dr Nori Dsouza.  He states that he was advised to get one ASAP from [id number] ED    Phone: [phone] or [phone]

## 2020-06-08 NOTE — ED TRIAGE NOTES
Patient arrives to ED with complaints of intermittent shooting chest pains that have been present since yesterday. Denies pain at time of triage. States episodes last for about 15 seconds and stop.     Denies shortness of breath or dizziness

## 2020-06-08 NOTE — ED NOTES
The patient left the Emergency Department ambulatory, alert and oriented and in no acute distress. The patient was encouraged to call or return to the ED for worsening issues or problems and was encouraged to schedule a follow up appointment for continuing care. The patient verbalized understanding of discharge instructions, all questions were answered. The patient has no further concerns at this time.

## 2020-06-09 NOTE — PROGRESS NOTES
Patient contacted regarding recent discharge and COVID-19 risk. Discussed COVID-19 related testing which was not done at this time. Care Transition Nurse/ Ambulatory Care Manager contacted the patient by telephone to perform post discharge assessment. Verified name and  with patient as identifiers. Patient has following risk factors of: age paroyxmal atfribrillation . CTN/ACM reviewed discharge instructions, medical action plan and red flags related to discharge diagnosis. Reviewed and educated them on any new and changed medications related to discharge diagnosis. Advised obtaining a 90-day supply of all daily and as-needed medications. Education provided regarding infection prevention, and signs and symptoms of COVID-19 and when to seek medical attention with patient who verbalized understanding. Discussed exposure protocols and quarantine from [address] you at higher risk for severe illness  and given an opportunity for questions and concerns. The patient agrees to contact the COVID-19 hotline [phone] or PCP office for questions related to their healthcare. CTN/ACM provided contact information for future reference. From CDC: Are you at higher risk for severe illness?  Wash your hands often.  Avoid close contact (6 feet, which is about two arm lengths) with people who are sick.  Put distance between yourself and other people if COVID-19 is spreading in your community.  Clean and disinfect frequently touched surfaces.  Avoid all cruise travel and non-essential air travel.  Call your healthcare professional if you have concerns about COVID-19 and your underlying condition or if you are sick. For more information on steps you can take to protect yourself, see CDC's How to Protect Yourself      Patient/family/caregiver given information for Junior Gann and agrees to enroll yes  Patient's preferred e-mail:  [email or web address]  Patient's preferred phone number: ([phone]  Based on Loop alert triggers, patient will be contacted by nurse care manager for worsening symptoms. Pt will be further monitored by COVID Loop Team based on severity of symptoms and risk factors.

## 2020-06-12 ENCOUNTER — TELEPHONE (OUTPATIENT)
Dept: CARDIOLOGY CLINIC | Age: 80
End: 2020-06-12

## 2020-06-12 NOTE — TELEPHONE ENCOUNTER
Patient is returning Sydnee's call. Patient declined a VV and would like to have an in office visit. Please advise.     Phone: [phone]

## 2020-06-15 NOTE — TELEPHONE ENCOUNTER
Patient would like to reschedule 6/18 appointment for any Monday (anytime) or any Friday (in the PM) in office.     Phone: [phone]

## 2020-09-10 NOTE — PROGRESS NOTES
Rebekah Lorenzo Yang, [address]  [address],  [address]  [place], [address] [facility]    Office [phone]  Fax [phone]  Cell [phone]    William Clark is a [de-identified] y.o. male last seen by me 8/2/19. Assessment  Encounter Diagnoses   Name Primary?  Paroxysmal atrial fibrillation (HCC) Yes    Agatston CAC score 200-399     Essential hypertension     Dyslipidemia     LANDRY (dyspnea on exertion)      Recommendations:    LANDRY w/ extreme exertion (steep incline while biking). This may simply be d/t his age and extreme exertion. Other possibilities include sub-max HR d/t metoprolol or possibly myocardial ischemia. We discussed tracking his HR but he does not have a smart phone or smart watch. - Stop metoprolol (on very low dose)  for a few weeks and see how he does. If sxs no better, consider stress testing    Paroxysmal AF, initially diagnosed March 2019. SB by exam and EKG today. Echo August 2019 demonstrates normal LV function, valves, and atria. - Metoprolol holiday as noted above  - Continue Eliquis 5 mg BID. CAD by CT heart scan March 2018 with . Stress echo 10/218 was normal. He has no sxs of angina at a very good functional capacity. - Continue ASA 81mg/d    Dyslipidemia managed by Joselin Contreras MD. He is on combination therapy. HTN. Borderline elevated BP today, uncertain controlled at home  - Strongly encouraged home BP monitoring     He will call back to let us know how he is doing. Follow-up and Dispositions    · Return in about 1 year (around 9/11/2021). Subjective:    William Clark reports that when biking up a steep hill, he experienced unusually severe, new onset LANDRY. Patient denies any exertional chest pain, syncope, orthopnea, edema or paroxysmal nocturnal dyspnea. He reports staying active by biking regularly. He states that his diet could use some improvement. He states that his resting HR is in the 50's.  He reports that he does not check his BP at home. No bleeding issues on Eliquis or ASA. Lipids monitored by Joyce Del Angel MD.     Of note, he lives in [place]. His wife is present today. 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    Cardiac testing  EKG 8/20/18 - SB 53, LVH, no significant change since 5/13/14  Echo 2014 - EF 60%, mild MR  Exercise Stress Echo 10/2018 - 7:00. Normal EF, no significant valvular abnormalities. Normal study. CT Heart scan 3/25/19 - Calcium score 235    Echo 8/6/19 -  EF 61 - 65%. No WMA Mild aortic valve sclerosis with no significant stenosis. Mild MR. Mild ascending aorta dilatation. Past Medical History:   Diagnosis Date    Arthritis     knees    Bleeding ulcer     BPH (benign prostatic hypertrophy)     Diverticulitis     removed with colon resection    High cholesterol     Hypertension     Other ill-defined conditions(799.89)     Enlarge prostate    PUD (peptic ulcer disease)     \"now healed\"      Current Outpatient Medications   Medication Sig Dispense Refill    saw palmetto 160 mg cap Take  by mouth.  magnesium 250 mg tab Take  by mouth.  ginkgo biloba (GINKOBA PO) Take  by mouth.  ezetimibe (ZETIA) 10 mg tablet TAKE ONE TABLET BY MOUTH DAILY FOR HIGH CHOLESTEROL 90 Tab 3    cholecalciferol, vitamin D3, 2,000 unit tab Take  by mouth.  metoprolol tartrate (LOPRESSOR) 25 mg tablet Take 12.5 mg by mouth two (2) times a day.  apixaban (ELIQUIS) 5 mg (74 tabs) starter pack Take  by mouth. Take 10 mg (two 5 mg tablets) by mouth twice a day for 7 days   Followed by 5 mg (one 5 mg tablet) by mouth twice a day      loratadine (ALLERCLEAR) 10 mg tablet Take 10 mg by mouth.  acetaminophen (TYLENOL) 325 mg tablet Take 325 mg by mouth every four (4) hours as needed for Pain (2 IN am AND 1 AT NIGHT DAILY).       glucosam-chond-hyalu-CF borate (MOVE FREE JOINT HEALTH) 750 mg-100 mg- 1.65 mg-108 mg tab Take 1 Tab by mouth daily.  Biotin 2,500 mcg cap Take 1 Tab by mouth daily.  lutein 20 mg cap Take 1 Tab by mouth daily.  OTHER Take 1 Tab by mouth daily. Tumeric      Omeprazole Magnesium 20 mg cpDR Take 20 mg by mouth daily.  multivits,Stress Formula-Zinc tablet Take 1 Tab by mouth daily.  vitamin E (AQUA GEMS) 400 unit capsule Take 400 Units by mouth daily.  ascorbic acid (VITAMIN C) 500 mg tablet Take 500 mg by mouth daily.  omega-3 fatty acids-vitamin e (FISH OIL) 1,000 mg cap Take 1 Cap by mouth daily.  co-enzyme Q-10 (CO Q-10) 100 mg capsule Take 100 mg by mouth daily.  finasteride (PROSCAR) 5 mg tablet Take 5 mg by mouth daily.  losartan-hydrochlorothiazide (HYZAAR) 100-25 mg per tablet Take 1 Tab by mouth daily.  pravastatin (PRAVACHOL) 40 mg tablet Take 40 mg by mouth nightly.  tamsulosin (FLOMAX) 0.4 mg capsule Take 0.4 mg by mouth daily.  OTHER Beta Carotine (unknown dose) daily, Terry Ginseng Root (unknown dose) daily, Saw Palmetto (unknown dose) one daily      vazquez's wort 300 mg cap Take  by mouth.  baclofen (LIORESAL) 10 mg tablet Take 10 mg by mouth as needed. No Known Allergies     Review of Systems  Constitutional: Negative for fever, chills, malaise/fatigue and diaphoresis. Respiratory: Negative for cough, hemoptysis, sputum production, and wheezing. +LANDRY  Cardiovascular: Negative for chest pain, orthopnea, claudication, leg swelling and PND. Gastrointestinal: Negative for heartburn, nausea, vomiting, blood in stool and melena. Genitourinary: Negative for dysuria and flank pain. Musculoskeletal: Negative for joint pain and back pain. Skin: Negative for rash. Neurological: Negative for focal weakness, seizures, loss of consciousness, weakness and headaches. Endo/Heme/Allergies: Does not bruise/bleed easily. Psychiatric/Behavioral: Negative for memory loss.  The patient does not have insomnia. Physical Exam  Visit Vitals  BP (!) 154/78 (BP 1 Location: Left arm, BP Patient Position: Sitting)   Pulse (!) 52   Ht 5' 7\" (1.702 m)   Wt 168 lb (76.2 kg)   SpO2 96%   BMI 26.31 kg/m²     Wt Readings from Last 3 Encounters:   09/11/20 168 lb (76.2 kg)   06/07/20 168 lb (76.2 kg)   08/02/19 173 lb (78.5 kg)      General - well developed well nourished  Neck - JVP normal, thyroid nl  Cardiac - normal S1, S2, no murmurs, rubs or gallops. No clicks  Vascular - carotids without bruits, radials, femorals and pedal pulses equal bilateral  Lungs - clear to auscultation bilaterals, no rales, wheezing or rhonchi  Abd - soft nontender, no HSM, no abd bruits  Extremities - no edema  Skin - no rash  Neuro - nonfocal  Psych - normal mood and affect    Cardiographics  EKG 8/20/18 - SB 53, LVH, no significant change since 5/13/14  EKG 3/2/19 - SB 51, LVH  Echo 8/2/19 - EF 60%, normal atrial dimensions   EKG 6/7/20- SB 54    Written by Don Carter, as dictated by Marissa Farrell M.D.        Marissa Farrell MD

## 2020-09-11 ENCOUNTER — OFFICE VISIT (OUTPATIENT)
Dept: CARDIOLOGY CLINIC | Age: 80
End: 2020-09-11
Payer: MEDICARE

## 2020-09-11 VITALS
DIASTOLIC BLOOD PRESSURE: 78 MMHG | SYSTOLIC BLOOD PRESSURE: 154 MMHG | WEIGHT: 168 LBS | HEART RATE: 52 BPM | BODY MASS INDEX: 26.37 KG/M2 | OXYGEN SATURATION: 96 % | HEIGHT: 67 IN

## 2020-09-11 DIAGNOSIS — R93.1 AGATSTON CAC SCORE 200-399: ICD-10-CM

## 2020-09-11 DIAGNOSIS — I48.0 PAROXYSMAL ATRIAL FIBRILLATION (HCC): Primary | ICD-10-CM

## 2020-09-11 DIAGNOSIS — I10 ESSENTIAL HYPERTENSION: ICD-10-CM

## 2020-09-11 DIAGNOSIS — R06.09 DOE (DYSPNEA ON EXERTION): ICD-10-CM

## 2020-09-11 DIAGNOSIS — E78.5 DYSLIPIDEMIA: ICD-10-CM

## 2020-09-11 PROCEDURE — G8536 NO DOC ELDER MAL SCRN: HCPCS | Performed by: SPECIALIST

## 2020-09-11 PROCEDURE — 99214 OFFICE O/P EST MOD 30 MIN: CPT | Performed by: SPECIALIST

## 2020-09-11 PROCEDURE — G8753 SYS BP > OR = 140: HCPCS | Performed by: SPECIALIST

## 2020-09-11 PROCEDURE — G8754 DIAS BP LESS 90: HCPCS | Performed by: SPECIALIST

## 2020-09-11 PROCEDURE — G8427 DOCREV CUR MEDS BY ELIG CLIN: HCPCS | Performed by: SPECIALIST

## 2020-09-11 PROCEDURE — G8419 CALC BMI OUT NRM PARAM NOF/U: HCPCS | Performed by: SPECIALIST

## 2020-09-11 PROCEDURE — 1101F PT FALLS ASSESS-DOCD LE1/YR: CPT | Performed by: SPECIALIST

## 2020-09-11 PROCEDURE — G8432 DEP SCR NOT DOC, RNG: HCPCS | Performed by: SPECIALIST

## 2020-09-11 RX ORDER — ST. JOHN'S WORT 300 MG
CAPSULE ORAL
COMMUNITY

## 2020-09-11 RX ORDER — ZINC GLUCONATE 10 MG
LOZENGE ORAL
COMMUNITY

## 2020-09-11 RX ORDER — SAW PALMETTO 160 MG
CAPSULE ORAL
COMMUNITY

## 2020-09-11 NOTE — PROGRESS NOTES
Natanael Rivera is a [de-identified] y.o. male    Visit Vitals  BP (!) 154/78 (BP 1 Location: Left arm, BP Patient Position: Sitting)   Pulse (!) 52   Ht 5' 7\" (1.702 m)   Wt 168 lb (76.2 kg)   SpO2 96%   BMI 26.31 kg/m²       Chief Complaint   Patient presents with    Cholesterol Problem    Hypertension    Irregular Heart Beat     afib       Chest pain no  SOB with exertion    Dizziness no  Swelling no  Recent hospital visit no  Refills no

## 2020-09-11 NOTE — PATIENT INSTRUCTIONS
- Please keep a log of your heart rates during exercise and at rest 
 
- Please get a blood pressure machine Please keep a log of your blood pressure at home and bring into your next visit. - Please consider wearing an Apple Watch to monitor your heart rate - Please call us in a few weeks with updates. - Stop metoprolol for two weeks and see how you feel

## 2020-09-11 NOTE — LETTER
9/13/20 Patient: Sean Workman YOB: 1940 Date of Visit: 9/11/2020 Louisa Paiz MD 
[address] [place] [address] VIA Facsimile: [phone] Dear Louisa Paiz MD, Thank you for referring Mr. Jhonatan Foley to [address] [facility] for evaluation. My notes for this consultation are attached. If you have questions, please do not hesitate to call me. I look forward to following your patient along with you. Sincerely, Jose Armando Grady MD

## 2020-10-08 ENCOUNTER — TELEPHONE (OUTPATIENT)
Dept: CARDIOLOGY CLINIC | Age: 80
End: 2020-10-08

## 2020-10-08 DIAGNOSIS — R06.09 DOE (DYSPNEA ON EXERTION): Primary | ICD-10-CM

## 2020-10-08 DIAGNOSIS — Z01.812 PRE-PROCEDURE LAB EXAM: ICD-10-CM

## 2021-01-05 ENCOUNTER — TELEPHONE (OUTPATIENT)
Dept: CARDIOLOGY CLINIC | Age: 81
End: 2021-01-05

## 2021-01-05 NOTE — TELEPHONE ENCOUNTER
S\W patient-appointment reminder. Also went over testing instructions and pt. Verbalized understanding.

## 2021-01-08 ENCOUNTER — ANCILLARY PROCEDURE (OUTPATIENT)
Dept: CARDIOLOGY CLINIC | Age: 81
End: 2021-01-08
Payer: MEDICARE

## 2021-01-08 VITALS — WEIGHT: 168 LBS | BODY MASS INDEX: 26.37 KG/M2 | HEIGHT: 67 IN

## 2021-01-08 DIAGNOSIS — R06.09 DOE (DYSPNEA ON EXERTION): ICD-10-CM

## 2021-01-08 PROCEDURE — A9500 TC99M SESTAMIBI: HCPCS | Performed by: INTERNAL MEDICINE

## 2021-01-08 PROCEDURE — 93015 CV STRESS TEST SUPVJ I&R: CPT | Performed by: INTERNAL MEDICINE

## 2021-01-08 PROCEDURE — 78452 HT MUSCLE IMAGE SPECT MULT: CPT | Performed by: INTERNAL MEDICINE

## 2021-01-08 RX ORDER — TETRAKIS(2-METHOXYISOBUTYLISOCYANIDE)COPPER(I) TETRAFLUOROBORATE 1 MG/ML
7.6 INJECTION, POWDER, LYOPHILIZED, FOR SOLUTION INTRAVENOUS ONCE
Status: COMPLETED | OUTPATIENT
Start: 2021-01-08 | End: 2021-01-08

## 2021-01-08 RX ORDER — TETRAKIS(2-METHOXYISOBUTYLISOCYANIDE)COPPER(I) TETRAFLUOROBORATE 1 MG/ML
24.7 INJECTION, POWDER, LYOPHILIZED, FOR SOLUTION INTRAVENOUS ONCE
Status: COMPLETED | OUTPATIENT
Start: 2021-01-08 | End: 2021-01-08

## 2021-01-08 RX ADMIN — TETRAKIS(2-METHOXYISOBUTYLISOCYANIDE)COPPER(I) TETRAFLUOROBORATE 7.6 MILLICURIE: 1 INJECTION, POWDER, LYOPHILIZED, FOR SOLUTION INTRAVENOUS at 13:30

## 2021-01-08 RX ADMIN — TETRAKIS(2-METHOXYISOBUTYLISOCYANIDE)COPPER(I) TETRAFLUOROBORATE 24.7 MILLICURIE: 1 INJECTION, POWDER, LYOPHILIZED, FOR SOLUTION INTRAVENOUS at 14:40

## 2021-01-11 LAB
STRESS ANGINA INDEX: 0
STRESS BASELINE DIAS BP: 84 MMHG
STRESS BASELINE HR: 67 BPM
STRESS BASELINE SYS BP: 152 MMHG
STRESS ESTIMATED WORKLOAD: 7 METS
STRESS EXERCISE DUR MIN: NORMAL MIN:SEC
STRESS O2 SAT PEAK: 98 %
STRESS O2 SAT REST: 99 %
STRESS PEAK DIAS BP: 94 MMHG
STRESS PEAK SYS BP: 226 MMHG
STRESS PERCENT HR ACHIEVED: 114 %
STRESS POST PEAK HR: 160 BPM
STRESS RATE PRESSURE PRODUCT: NORMAL BPM*MMHG
STRESS SR DUKE TREADMILL SCORE: 4
STRESS ST DEPRESSION: 0 MM
STRESS ST ELEVATION: 0 MM
STRESS TARGET HR: 140 BPM

## 2021-01-14 ENCOUNTER — TELEPHONE (OUTPATIENT)
Dept: CARDIOLOGY CLINIC | Age: 81
End: 2021-01-14

## 2021-01-14 NOTE — TELEPHONE ENCOUNTER
Per Hernandez Aguirre, NP:    Can you please let him know that this test was normal, but that we would like to help him link back in with another doc, possibly Dr. Maria Clarke. Unable to reach pt. Message left detailed message informing the pt of his nuclear stress test results. I also advised him of Dr. Dave Phillip leaving and out wish for him to continue with our practice. I asked him to call us and reschedule his September 2021 appointment with Dr. Lidya Celis, to one of his convenience with Dr. Maria Clarke.

## 2022-03-18 PROBLEM — I10 ESSENTIAL HYPERTENSION: Status: ACTIVE | Noted: 2019-03-22

## 2022-03-18 PROBLEM — R93.1 AGATSTON CAC SCORE 200-399: Status: ACTIVE | Noted: 2019-08-02

## 2022-03-19 PROBLEM — I48.0 PAROXYSMAL ATRIAL FIBRILLATION (HCC): Status: ACTIVE | Noted: 2019-03-22

## 2022-03-19 PROBLEM — K27.9 PEPTIC ULCER DISEASE: Status: ACTIVE | Noted: 2019-03-22

## 2022-03-19 PROBLEM — E78.5 DYSLIPIDEMIA: Status: ACTIVE | Noted: 2019-08-02

## 2023-05-01 ENCOUNTER — OFFICE VISIT (OUTPATIENT)
Age: 83
End: 2023-05-01

## 2023-05-01 VITALS — WEIGHT: 168 LBS | BODY MASS INDEX: 26.37 KG/M2 | HEIGHT: 67 IN

## 2023-05-01 DIAGNOSIS — M17.11 OSTEOARTHRITIS OF RIGHT KNEE, UNSPECIFIED OSTEOARTHRITIS TYPE: ICD-10-CM

## 2023-05-01 DIAGNOSIS — M25.561 RIGHT KNEE PAIN, UNSPECIFIED CHRONICITY: Primary | ICD-10-CM

## 2023-05-01 DIAGNOSIS — Z01.818 PRE-OP TESTING: ICD-10-CM

## 2023-05-01 RX ORDER — FINASTERIDE 5 MG/1
TABLET, FILM COATED ORAL
COMMUNITY

## 2023-05-01 RX ORDER — BACLOFEN 10 MG/1
TABLET ORAL
COMMUNITY
Start: 2022-03-02

## 2023-05-01 RX ORDER — TAMSULOSIN HYDROCHLORIDE 0.4 MG/1
CAPSULE ORAL
COMMUNITY

## 2023-05-01 RX ORDER — METOPROLOL SUCCINATE 25 MG/1
TABLET, EXTENDED RELEASE ORAL
COMMUNITY

## 2023-05-01 RX ORDER — LOSARTAN POTASSIUM 100 MG/1
TABLET ORAL
COMMUNITY

## 2023-05-01 RX ORDER — EZETIMIBE 10 MG/1
TABLET ORAL
COMMUNITY
Start: 2021-05-17

## 2023-05-01 RX ORDER — DOCUSATE SODIUM 100 MG/1
100 CAPSULE, LIQUID FILLED ORAL 2 TIMES DAILY
COMMUNITY
Start: 2023-04-14

## 2023-05-01 RX ORDER — PRAVASTATIN SODIUM 40 MG
TABLET ORAL
COMMUNITY
Start: 2018-01-02

## 2023-05-01 NOTE — PATIENT INSTRUCTIONS
You have knee pain and a fever or rash. You have such bad pain that you cannot use your knee. Watch closely for changes in your health, and be sure to contact your doctor if you have any problems. Where can you learn more? Go to http://www.woods.com/ and enter W187 to learn more about \"Knee Arthritis: Care Instructions. \"  Current as of: March 9, 2022               Content Version: 13.5  © 2006-2022 [employer]wise, [employer]. Care instructions adapted under license by [facility] ([facility]). If you have questions about a medical condition or this instruction, always ask your healthcare professional. [address] any warranty or liability for your use of this information.

## 2023-05-03 ENCOUNTER — TELEPHONE (OUTPATIENT)
Age: 83
End: 2023-05-03

## 2023-05-03 NOTE — TELEPHONE ENCOUNTER
----- Message from Hema Garcia sent at 5/3/2023  8:20 AM EDT -----  She is his wife actually and has questions regarding his surgery that did not get answered at their visit per the patient.   ----- Message -----  From: Hema Garcia  Sent: 5/2/2023   3:21 PM EDT  To: SHAYLA Miller    noted  ----- Message -----  From: SHAYLA Miller  Sent: 5/2/2023   1:02 PM EDT  To: Sona Lim can't speak to his daughter about his healthcare decisions since she is not listed as his Power of [address] [place] [address]. ----- Message -----  From: Hema Garcia  Sent: 5/2/2023  11:26 AM EDT  To: SHAYLA Miller    Patient was seen yesterday for right knee pain. He is now schd for a surgery and his daughter ( I think) is requesting you to give her a call [phone] regarding this.

## 2023-05-10 DIAGNOSIS — M79.604 BILATERAL LEG PAIN: Primary | ICD-10-CM

## 2023-05-10 DIAGNOSIS — M79.605 BILATERAL LEG PAIN: Primary | ICD-10-CM

## 2023-05-11 DIAGNOSIS — M79.604 BILATERAL LEG PAIN: ICD-10-CM

## 2023-05-11 DIAGNOSIS — M79.605 BILATERAL LEG PAIN: ICD-10-CM

## 2023-05-11 DIAGNOSIS — Z01.818 PRE-OP TESTING: ICD-10-CM

## 2023-05-11 DIAGNOSIS — M25.561 RIGHT KNEE PAIN, UNSPECIFIED CHRONICITY: ICD-10-CM

## 2023-05-11 DIAGNOSIS — M17.11 OSTEOARTHRITIS OF RIGHT KNEE, UNSPECIFIED OSTEOARTHRITIS TYPE: ICD-10-CM

## 2023-05-17 LAB — CREATININE, EXTERNAL: 1.44

## 2023-05-31 DIAGNOSIS — M17.11 OSTEOARTHRITIS OF RIGHT KNEE, UNSPECIFIED OSTEOARTHRITIS TYPE: ICD-10-CM

## 2023-05-31 DIAGNOSIS — M25.561 RIGHT KNEE PAIN, UNSPECIFIED CHRONICITY: ICD-10-CM

## 2023-05-31 DIAGNOSIS — Z01.818 PRE-OP TESTING: ICD-10-CM

## 2023-06-07 DIAGNOSIS — Z01.818 PRE-OP TESTING: ICD-10-CM

## 2023-06-07 DIAGNOSIS — M25.561 RIGHT KNEE PAIN, UNSPECIFIED CHRONICITY: ICD-10-CM

## 2023-06-07 DIAGNOSIS — M17.11 OSTEOARTHRITIS OF RIGHT KNEE, UNSPECIFIED OSTEOARTHRITIS TYPE: ICD-10-CM

## 2023-06-14 ENCOUNTER — APPOINTMENT (OUTPATIENT)
Dept: URBAN - NONMETROPOLITAN AREA SURGERY 7 | Age: 83
Setting detail: DERMATOLOGY
End: 2023-06-15

## 2023-06-14 DIAGNOSIS — D485 NEOPLASM OF UNCERTAIN BEHAVIOR OF SKIN: ICD-10-CM

## 2023-06-14 PROBLEM — D48.5 NEOPLASM OF UNCERTAIN BEHAVIOR OF SKIN: Status: ACTIVE | Noted: 2023-06-14

## 2023-06-14 PROCEDURE — OTHER BIOPSY BY SHAVE METHOD: OTHER

## 2023-06-14 PROCEDURE — OTHER MIPS QUALITY: OTHER

## 2023-06-14 PROCEDURE — 11102 TANGNTL BX SKIN SINGLE LES: CPT

## 2023-06-14 ASSESSMENT — LOCATION ZONE DERM: LOCATION ZONE: TRUNK

## 2023-06-14 ASSESSMENT — LOCATION SIMPLE DESCRIPTION DERM: LOCATION SIMPLE: ABDOMEN

## 2023-06-14 ASSESSMENT — LOCATION DETAILED DESCRIPTION DERM: LOCATION DETAILED: PERIUMBILICAL SKIN

## 2023-06-14 NOTE — PROCEDURE: MIPS QUALITY
Quality 47: Advance Care Plan: Advance Care Planning discussed and documented in the medical record; patient did not wish or was not able to name a surrogate decision maker or provide an advance care plan.
Quality 110: Preventive Care And Screening: Influenza Immunization: Influenza Immunization Administered during Influenza season
Quality 226: Preventive Care And Screening: Tobacco Use: Screening And Cessation Intervention: Patient screened for tobacco use and is an ex/non-smoker
Quality 111:Pneumonia Vaccination Status For Older Adults: Patient received any pneumococcal conjugate or polysaccharide vaccine on or after their 60th birthday and before the end of the measurement period
Quality 130: Documentation Of Current Medications In The Medical Record: Current Medications Documented
Detail Level: Detailed

## 2023-06-14 NOTE — HPI: SKIN LESION
What Type Of Note Output Would You Prefer (Optional)?: Bullet Format
How Severe Is Your Skin Lesion?: mild
Has Your Skin Lesion Been Treated?: not been treated
Is This A New Presentation, Or A Follow-Up?: Skin Lesion
Additional History: Patient has surgery to his stomach for a colon problem about 7 years ago

## 2023-07-17 ENCOUNTER — TELEMEDICINE (OUTPATIENT)
Age: 83
End: 2023-07-17

## 2023-07-17 DIAGNOSIS — M17.11 OSTEOARTHRITIS OF RIGHT KNEE, UNSPECIFIED OSTEOARTHRITIS TYPE: Primary | ICD-10-CM

## 2023-07-17 RX ORDER — OXYCODONE HYDROCHLORIDE AND ACETAMINOPHEN 5; 325 MG/1; MG/1
1 TABLET ORAL
Qty: 30 TABLET | Refills: 0 | Status: SHIPPED | OUTPATIENT
Start: 2023-07-17 | End: 2023-07-25

## 2023-07-17 RX ORDER — ONDANSETRON 8 MG/1
8 TABLET, ORALLY DISINTEGRATING ORAL EVERY 8 HOURS PRN
Qty: 20 TABLET | Refills: 0 | Status: SHIPPED | OUTPATIENT
Start: 2023-07-17 | End: 2023-07-24

## 2023-07-17 RX ORDER — CEPHALEXIN 500 MG/1
500 CAPSULE ORAL 4 TIMES DAILY
Qty: 40 CAPSULE | Refills: 0 | Status: SHIPPED | OUTPATIENT
Start: 2023-07-17 | End: 2023-07-27

## 2023-07-17 NOTE — PROGRESS NOTES
Connor Sainz (:  1940) is a Established patient, presenting virtually for evaluation of the following:    [address], [address] [address]  Dept: [phone]  Dept Fax: [phone]   Chief Complaint   Patient presents with    Pre-op Exam    Knee Pain     Patient-Reported Vitals  No data recorded   No Known Allergies  Current Outpatient Medications   Medication Sig Dispense Refill    ondansetron (ZOFRAN-ODT) 8 MG TBDP disintegrating tablet Place 1 tablet under the tongue every 8 hours as needed for Nausea or Vomiting Do Not start until after surgery 20 tablet 0    cephALEXin (KEFLEX) 500 MG capsule Take 1 capsule by mouth 4 times daily for 10 days 40 capsule 0    apixaban (ELIQUIS) 5 MG TABS tablet Eliquis 5 mg tablet   Twice a day. baclofen (LIORESAL) 10 MG tablet baclofen 10 mg tablet   Take 1 tablet twice a day by oral route as needed for 30 days. docusate sodium (COLACE) 100 MG capsule Take 1 capsule by mouth 2 times daily      ezetimibe (ZETIA) 10 MG tablet ezetimibe 10 mg tablet   Take 1 tablet every day by oral route. finasteride (PROSCAR) 5 MG tablet finasteride 5 mg tablet   1 per day      losartan (COZAAR) 100 MG tablet losartan 100 mg tablet   1 per day      metoprolol succinate (TOPROL XL) 25 MG extended release tablet metoprolol succinate ER 25 mg tablet,extended release 24 hr   1 per day      pravastatin (PRAVACHOL) 40 MG tablet pravastatin 40 mg tablet   1 per day      tamsulosin (FLOMAX) 0.4 MG capsule tamsulosin 0.4 mg capsule   1 per day       No current facility-administered medications for this visit.       Patient Active Problem List   Diagnosis    Essential hypertension    Agatston CAC score 200-399    Diverticulitis    S/P colostomy takedown    Dyslipidemia    Peptic ulcer disease    Paroxysmal atrial fibrillation (HCC)      Family History   Problem Relation

## 2023-07-18 DIAGNOSIS — Z96.651 STATUS POST TOTAL RIGHT KNEE REPLACEMENT: Primary | ICD-10-CM

## 2023-07-20 RX ORDER — SULFAMETHOXAZOLE AND TRIMETHOPRIM 800; 160 MG/1; MG/1
1 TABLET ORAL 2 TIMES DAILY
Qty: 6 TABLET | Refills: 0 | Status: SHIPPED | OUTPATIENT
Start: 2023-07-20 | End: 2023-07-23

## 2023-07-20 NOTE — PROGRESS NOTES
Notified by office staff that patient requesting alternative post-operative antibiotic due to increased risk of c. Diffe with Keflex. Prescription for Bactrim DS ordered.

## 2023-07-25 ENCOUNTER — HOSPITAL ENCOUNTER (OUTPATIENT)
Dept: VASCULAR SURGERY | Facility: HOSPITAL | Age: 83
Discharge: HOME | End: 2023-07-27
Payer: MEDICARE

## 2023-07-25 PROCEDURE — 93970 EXTREMITY STUDY: CPT

## 2023-07-31 ENCOUNTER — TELEPHONE (OUTPATIENT)
Age: 83
End: 2023-07-31

## 2023-07-31 DIAGNOSIS — Z96.651 S/P TKR (TOTAL KNEE REPLACEMENT) USING CEMENT, RIGHT: Primary | ICD-10-CM

## 2023-07-31 RX ORDER — OXYCODONE HYDROCHLORIDE AND ACETAMINOPHEN 5; 325 MG/1; MG/1
1 TABLET ORAL
Qty: 30 TABLET | Refills: 0 | Status: SHIPPED | OUTPATIENT
Start: 2023-07-31 | End: 2023-08-01

## 2023-07-31 NOTE — TELEPHONE ENCOUNTER
Patient had a tkr done on 7/26/23     He is requesting a refill on his percocet.      First refill from [address] [place] [address]

## 2023-08-01 RX ORDER — OXYCODONE HYDROCHLORIDE AND ACETAMINOPHEN 5; 325 MG/1; MG/1
1 TABLET ORAL
Qty: 30 TABLET | Refills: 0 | Status: ON HOLD | OUTPATIENT
Start: 2023-08-01 | End: 2023-08-05 | Stop reason: HOSPADM

## 2023-08-01 NOTE — TELEPHONE ENCOUNTER
Phone - [phone] [address], [address] [facility]    Updated Pharmacy - please send in the medication to this pharmacy location

## 2023-08-03 ENCOUNTER — TELEMEDICINE (OUTPATIENT)
Age: 83
End: 2023-08-03

## 2023-08-03 DIAGNOSIS — Z96.651 S/P TKR (TOTAL KNEE REPLACEMENT) USING CEMENT, RIGHT: Primary | ICD-10-CM

## 2023-08-03 PROBLEM — K21.9 GERD (GASTROESOPHAGEAL REFLUX DISEASE): Status: ACTIVE | Noted: 2023-08-03

## 2023-08-03 PROBLEM — I10 HYPERTENSION: Status: ACTIVE | Noted: 2019-03-22

## 2023-08-03 PROCEDURE — 99024 POSTOP FOLLOW-UP VISIT: CPT | Performed by: ORTHOPAEDIC SURGERY

## 2023-08-03 NOTE — PROGRESS NOTES
education level: None   Tobacco Use    Smoking status: Never    Smokeless tobacco: Never   Substance and Sexual Activity    Alcohol use: Yes      Past Surgical History:   Procedure Laterality Date    COLON SURGERY        Past Medical History:   Diagnosis Date    Heart disease     Hypercholesteremia     Hypertension         I have reviewed and agree with [address] and ROS and intake form in chart and the record furthermore I have reviewed prior medical record(s) regarding this patients care during this appointment. Review of Systems:   Patient is a pleasant appearing individual, appropriately dressed, well hydrated, well nourished, who is alert, appropriately oriented for age, and in no acute distress with a normal gait and normal affect who does not appear to be in any significant pain. Physical Exam:  Right knee - Neurovascularly intact with good cap refill, full range of motion and full strength, well healed incision noted, no swelling, no erythema, no instability. Left knee - Decrease range of motion with flexion, Some crepitation, Grossly neurovascularly intact, Good cap refill, No skin lesion, Moderate swelling, No gross instability, Some quadriceps weakness   Visit Diagnoses         Codes    S/P TKR (total knee replacement) using cement, right    -  Primary Z96.651          VIRTUAL APPOINTMENT    Physical examination shows incision is clean, dry, and intact. HPI:  The patient is here status post right total knee replacement. Doing well. Just having some soreness. He is also having constipation. Assessment/Plan:  Plan will be for him to continue with Fleet's enema. I did tell him if he is having difficulty since it has been 8 days, that he should go to the emergency room. We will see him back in person in about 3 weeks and go from there for status post right total knee replacement.       As part of continued conservative pain management options the patient was advised to utilize Tylenol or OTC

## 2023-08-04 ENCOUNTER — HOSPITAL ENCOUNTER (OUTPATIENT)
Facility: HOSPITAL | Age: 83
Setting detail: OBSERVATION
Discharge: HOME | End: 2023-08-05
Attending: EMERGENCY MEDICINE | Admitting: STUDENT IN AN ORGANIZED HEALTH CARE EDUCATION/TRAINING PROGRAM
Payer: MEDICARE

## 2023-08-04 ENCOUNTER — APPOINTMENT (OUTPATIENT)
Facility: HOSPITAL | Age: 83
End: 2023-08-04
Payer: MEDICARE

## 2023-08-04 DIAGNOSIS — K59.00 CONSTIPATION, UNSPECIFIED CONSTIPATION TYPE: ICD-10-CM

## 2023-08-04 DIAGNOSIS — R33.9 URINARY RETENTION: ICD-10-CM

## 2023-08-04 DIAGNOSIS — R11.0 NAUSEA: Primary | ICD-10-CM

## 2023-08-04 DIAGNOSIS — R10.84 GENERALIZED ABDOMINAL PAIN: ICD-10-CM

## 2023-08-04 LAB
ALBUMIN SERPL-MCNC: 3.4 G/DL (ref 3.5–5)
ALBUMIN/GLOB SERPL: 0.9 (ref 1.1–2.2)
ALP SERPL-CCNC: 110 U/L (ref 45–117)
ALT SERPL-CCNC: 48 U/L (ref 12–78)
ANION GAP SERPL CALC-SCNC: 9 MMOL/L (ref 5–15)
APPEARANCE UR: CLEAR
AST SERPL-CCNC: 47 U/L (ref 15–37)
BACTERIA URNS QL MICRO: NEGATIVE /HPF
BASOPHILS # BLD: 0 K/UL (ref 0–0.1)
BASOPHILS NFR BLD: 0 % (ref 0–1)
BILIRUB SERPL-MCNC: 1.5 MG/DL (ref 0.2–1)
BILIRUB UR QL: NEGATIVE
BUN SERPL-MCNC: 20 MG/DL (ref 6–20)
BUN/CREAT SERPL: 13 (ref 12–20)
CALCIUM SERPL-MCNC: 9.5 MG/DL (ref 8.5–10.1)
CHLORIDE SERPL-SCNC: 103 MMOL/L (ref 97–108)
CO2 SERPL-SCNC: 22 MMOL/L (ref 21–32)
COLOR UR: ABNORMAL
COMMENT:: NORMAL
CREAT SERPL-MCNC: 1.56 MG/DL (ref 0.7–1.3)
DIFFERENTIAL METHOD BLD: ABNORMAL
EOSINOPHIL # BLD: 0.1 K/UL (ref 0–0.4)
EOSINOPHIL NFR BLD: 2 % (ref 0–7)
EPITH CASTS URNS QL MICRO: ABNORMAL /LPF
ERYTHROCYTE [DISTWIDTH] IN BLOOD BY AUTOMATED COUNT: 12.8 % (ref 11.5–14.5)
GLOBULIN SER CALC-MCNC: 3.9 G/DL (ref 2–4)
GLUCOSE SERPL-MCNC: 119 MG/DL (ref 65–100)
GLUCOSE UR STRIP.AUTO-MCNC: NEGATIVE MG/DL
HCT VFR BLD AUTO: 33.9 % (ref 36.6–50.3)
HGB BLD-MCNC: 11.8 G/DL (ref 12.1–17)
HGB UR QL STRIP: NEGATIVE
HYALINE CASTS URNS QL MICRO: ABNORMAL /LPF (ref 0–2)
IMM GRANULOCYTES # BLD AUTO: 0 K/UL (ref 0–0.04)
IMM GRANULOCYTES NFR BLD AUTO: 1 % (ref 0–0.5)
KETONES UR QL STRIP.AUTO: 15 MG/DL
LEUKOCYTE ESTERASE UR QL STRIP.AUTO: NEGATIVE
LIPASE SERPL-CCNC: 56 U/L (ref 73–393)
LYMPHOCYTES # BLD: 1.3 K/UL (ref 0.8–3.5)
LYMPHOCYTES NFR BLD: 14 % (ref 12–49)
MCH RBC QN AUTO: 31 PG (ref 26–34)
MCHC RBC AUTO-ENTMCNC: 34.8 G/DL (ref 30–36.5)
MCV RBC AUTO: 89 FL (ref 80–99)
MONOCYTES # BLD: 0.8 K/UL (ref 0–1)
MONOCYTES NFR BLD: 9 % (ref 5–13)
NEUTS SEG # BLD: 6.6 K/UL (ref 1.8–8)
NEUTS SEG NFR BLD: 74 % (ref 32–75)
NITRITE UR QL STRIP.AUTO: NEGATIVE
NRBC # BLD: 0 K/UL (ref 0–0.01)
NRBC BLD-RTO: 0 PER 100 WBC
PH UR STRIP: 7.5 (ref 5–8)
PLATELET # BLD AUTO: 281 K/UL (ref 150–400)
PMV BLD AUTO: 10.3 FL (ref 8.9–12.9)
POTASSIUM SERPL-SCNC: 3.9 MMOL/L (ref 3.5–5.1)
PROT SERPL-MCNC: 7.3 G/DL (ref 6.4–8.2)
PROT UR STRIP-MCNC: NEGATIVE MG/DL
RBC # BLD AUTO: 3.81 M/UL (ref 4.1–5.7)
RBC #/AREA URNS HPF: ABNORMAL /HPF (ref 0–5)
SODIUM SERPL-SCNC: 134 MMOL/L (ref 136–145)
SP GR UR REFRACTOMETRY: 1.03 (ref 1–1.03)
SPECIMEN HOLD: NORMAL
URINE CULTURE IF INDICATED: ABNORMAL
UROBILINOGEN UR QL STRIP.AUTO: 1 EU/DL (ref 0.2–1)
WBC # BLD AUTO: 8.9 K/UL (ref 4.1–11.1)
WBC URNS QL MICRO: ABNORMAL /HPF (ref 0–4)

## 2023-08-04 PROCEDURE — G0378 HOSPITAL OBSERVATION PER HR: HCPCS

## 2023-08-04 PROCEDURE — 99285 EMERGENCY DEPT VISIT HI MDM: CPT

## 2023-08-04 PROCEDURE — 96375 TX/PRO/DX INJ NEW DRUG ADDON: CPT

## 2023-08-04 PROCEDURE — 80053 COMPREHEN METABOLIC PANEL: CPT

## 2023-08-04 PROCEDURE — 6370000000 HC RX 637 (ALT 250 FOR IP): Performed by: STUDENT IN AN ORGANIZED HEALTH CARE EDUCATION/TRAINING PROGRAM

## 2023-08-04 PROCEDURE — 6360000004 HC RX CONTRAST MEDICATION: Performed by: RADIOLOGY

## 2023-08-04 PROCEDURE — 74177 CT ABD & PELVIS W/CONTRAST: CPT

## 2023-08-04 PROCEDURE — 74183 MRI ABD W/O CNTR FLWD CNTR: CPT

## 2023-08-04 PROCEDURE — 83690 ASSAY OF LIPASE: CPT

## 2023-08-04 PROCEDURE — 81001 URINALYSIS AUTO W/SCOPE: CPT

## 2023-08-04 PROCEDURE — 51701 INSERT BLADDER CATHETER: CPT

## 2023-08-04 PROCEDURE — 93005 ELECTROCARDIOGRAM TRACING: CPT | Performed by: STUDENT IN AN ORGANIZED HEALTH CARE EDUCATION/TRAINING PROGRAM

## 2023-08-04 PROCEDURE — 6360000002 HC RX W HCPCS

## 2023-08-04 PROCEDURE — 96374 THER/PROPH/DIAG INJ IV PUSH: CPT

## 2023-08-04 PROCEDURE — 2580000003 HC RX 258

## 2023-08-04 PROCEDURE — 85025 COMPLETE CBC W/AUTO DIFF WBC: CPT

## 2023-08-04 PROCEDURE — A9575 INJ GADOTERATE MEGLUMI 0.1ML: HCPCS | Performed by: RADIOLOGY

## 2023-08-04 PROCEDURE — 94761 N-INVAS EAR/PLS OXIMETRY MLT: CPT

## 2023-08-04 PROCEDURE — 36415 COLL VENOUS BLD VENIPUNCTURE: CPT

## 2023-08-04 PROCEDURE — 6360000004 HC RX CONTRAST MEDICATION: Performed by: EMERGENCY MEDICINE

## 2023-08-04 PROCEDURE — 2580000003 HC RX 258: Performed by: STUDENT IN AN ORGANIZED HEALTH CARE EDUCATION/TRAINING PROGRAM

## 2023-08-04 RX ORDER — SENNA AND DOCUSATE SODIUM 50; 8.6 MG/1; MG/1
1 TABLET, FILM COATED ORAL 2 TIMES DAILY
Status: DISCONTINUED | OUTPATIENT
Start: 2023-08-04 | End: 2023-08-05 | Stop reason: HOSPADM

## 2023-08-04 RX ORDER — DIPHENHYDRAMINE HYDROCHLORIDE 50 MG/ML
25 INJECTION INTRAMUSCULAR; INTRAVENOUS
Status: COMPLETED | OUTPATIENT
Start: 2023-08-04 | End: 2023-08-04

## 2023-08-04 RX ORDER — GADOTERATE MEGLUMINE 376.9 MG/ML
15 INJECTION INTRAVENOUS
Status: COMPLETED | OUTPATIENT
Start: 2023-08-04 | End: 2023-08-04

## 2023-08-04 RX ORDER — PRAVASTATIN SODIUM 20 MG
40 TABLET ORAL DAILY
Status: DISCONTINUED | OUTPATIENT
Start: 2023-08-04 | End: 2023-08-05 | Stop reason: HOSPADM

## 2023-08-04 RX ORDER — FINASTERIDE 5 MG/1
5 TABLET, FILM COATED ORAL DAILY
Status: DISCONTINUED | OUTPATIENT
Start: 2023-08-05 | End: 2023-08-05 | Stop reason: HOSPADM

## 2023-08-04 RX ORDER — ONDANSETRON 2 MG/ML
4 INJECTION INTRAMUSCULAR; INTRAVENOUS ONCE
Status: COMPLETED | OUTPATIENT
Start: 2023-08-04 | End: 2023-08-04

## 2023-08-04 RX ORDER — DOCUSATE SODIUM 100 MG/1
100 CAPSULE, LIQUID FILLED ORAL 2 TIMES DAILY
Status: DISCONTINUED | OUTPATIENT
Start: 2023-08-04 | End: 2023-08-04

## 2023-08-04 RX ORDER — BISACODYL 5 MG/1
5 TABLET, DELAYED RELEASE ORAL DAILY PRN
Status: DISCONTINUED | OUTPATIENT
Start: 2023-08-04 | End: 2023-08-05 | Stop reason: HOSPADM

## 2023-08-04 RX ORDER — ACETAMINOPHEN 325 MG/1
650 TABLET ORAL EVERY 6 HOURS PRN
Status: DISCONTINUED | OUTPATIENT
Start: 2023-08-04 | End: 2023-08-05 | Stop reason: HOSPADM

## 2023-08-04 RX ORDER — METOPROLOL SUCCINATE 25 MG/1
25 TABLET, EXTENDED RELEASE ORAL DAILY
Status: DISCONTINUED | OUTPATIENT
Start: 2023-08-04 | End: 2023-08-05 | Stop reason: HOSPADM

## 2023-08-04 RX ORDER — SODIUM CHLORIDE 0.9 % (FLUSH) 0.9 %
5-40 SYRINGE (ML) INJECTION EVERY 12 HOURS SCHEDULED
Status: DISCONTINUED | OUTPATIENT
Start: 2023-08-04 | End: 2023-08-05 | Stop reason: HOSPADM

## 2023-08-04 RX ORDER — LOSARTAN POTASSIUM 50 MG/1
100 TABLET ORAL DAILY
Status: DISCONTINUED | OUTPATIENT
Start: 2023-08-05 | End: 2023-08-05 | Stop reason: HOSPADM

## 2023-08-04 RX ORDER — TAMSULOSIN HYDROCHLORIDE 0.4 MG/1
0.4 CAPSULE ORAL DAILY
Status: DISCONTINUED | OUTPATIENT
Start: 2023-08-05 | End: 2023-08-04

## 2023-08-04 RX ORDER — SODIUM CHLORIDE 0.9 % (FLUSH) 0.9 %
5-40 SYRINGE (ML) INJECTION PRN
Status: DISCONTINUED | OUTPATIENT
Start: 2023-08-04 | End: 2023-08-05 | Stop reason: HOSPADM

## 2023-08-04 RX ORDER — BISACODYL 5 MG/1
10 TABLET, DELAYED RELEASE ORAL ONCE
Status: COMPLETED | OUTPATIENT
Start: 2023-08-04 | End: 2023-08-04

## 2023-08-04 RX ORDER — ACETAMINOPHEN 650 MG/1
650 SUPPOSITORY RECTAL EVERY 6 HOURS PRN
Status: DISCONTINUED | OUTPATIENT
Start: 2023-08-04 | End: 2023-08-05 | Stop reason: HOSPADM

## 2023-08-04 RX ORDER — TAMSULOSIN HYDROCHLORIDE 0.4 MG/1
0.4 CAPSULE ORAL DAILY
Status: DISCONTINUED | OUTPATIENT
Start: 2023-08-04 | End: 2023-08-05 | Stop reason: HOSPADM

## 2023-08-04 RX ORDER — SODIUM CHLORIDE 9 MG/ML
INJECTION, SOLUTION INTRAVENOUS PRN
Status: DISCONTINUED | OUTPATIENT
Start: 2023-08-04 | End: 2023-08-05 | Stop reason: HOSPADM

## 2023-08-04 RX ORDER — ONDANSETRON 2 MG/ML
4 INJECTION INTRAMUSCULAR; INTRAVENOUS EVERY 6 HOURS PRN
Status: DISCONTINUED | OUTPATIENT
Start: 2023-08-04 | End: 2023-08-05 | Stop reason: HOSPADM

## 2023-08-04 RX ORDER — METOPROLOL SUCCINATE 25 MG/1
25 TABLET, EXTENDED RELEASE ORAL DAILY
Status: DISCONTINUED | OUTPATIENT
Start: 2023-08-05 | End: 2023-08-04

## 2023-08-04 RX ORDER — PROCHLORPERAZINE EDISYLATE 5 MG/ML
10 INJECTION INTRAMUSCULAR; INTRAVENOUS
Status: COMPLETED | OUTPATIENT
Start: 2023-08-04 | End: 2023-08-04

## 2023-08-04 RX ORDER — 0.9 % SODIUM CHLORIDE 0.9 %
1000 INTRAVENOUS SOLUTION INTRAVENOUS ONCE
Status: COMPLETED | OUTPATIENT
Start: 2023-08-04 | End: 2023-08-04

## 2023-08-04 RX ORDER — ONDANSETRON 4 MG/1
4 TABLET, ORALLY DISINTEGRATING ORAL EVERY 8 HOURS PRN
Status: DISCONTINUED | OUTPATIENT
Start: 2023-08-04 | End: 2023-08-05 | Stop reason: HOSPADM

## 2023-08-04 RX ORDER — POLYETHYLENE GLYCOL 3350 17 G/17G
17 POWDER, FOR SOLUTION ORAL DAILY
Status: DISCONTINUED | OUTPATIENT
Start: 2023-08-04 | End: 2023-08-05 | Stop reason: HOSPADM

## 2023-08-04 RX ORDER — PRAVASTATIN SODIUM 20 MG
40 TABLET ORAL NIGHTLY
Status: DISCONTINUED | OUTPATIENT
Start: 2023-08-04 | End: 2023-08-04

## 2023-08-04 RX ADMIN — POLYETHYLENE GLYCOL 3350 17 G: 17 POWDER, FOR SOLUTION ORAL at 13:51

## 2023-08-04 RX ADMIN — GADOTERATE MEGLUMINE 15 ML: 376.9 INJECTION INTRAVENOUS at 17:37

## 2023-08-04 RX ADMIN — DIPHENHYDRAMINE HYDROCHLORIDE 25 MG: 50 INJECTION INTRAMUSCULAR; INTRAVENOUS at 13:51

## 2023-08-04 RX ADMIN — PRAVASTATIN SODIUM 40 MG: 20 TABLET ORAL at 13:51

## 2023-08-04 RX ADMIN — APIXABAN 5 MG: 5 TABLET, FILM COATED ORAL at 22:47

## 2023-08-04 RX ADMIN — TAMSULOSIN HYDROCHLORIDE 0.4 MG: 0.4 CAPSULE ORAL at 15:23

## 2023-08-04 RX ADMIN — DOCUSATE SODIUM 100 MG: 100 CAPSULE, LIQUID FILLED ORAL at 13:58

## 2023-08-04 RX ADMIN — APIXABAN 5 MG: 5 TABLET, FILM COATED ORAL at 13:52

## 2023-08-04 RX ADMIN — SODIUM CHLORIDE 1000 ML: 9 INJECTION, SOLUTION INTRAVENOUS at 10:13

## 2023-08-04 RX ADMIN — BISACODYL 10 MG: 5 TABLET, COATED ORAL at 15:23

## 2023-08-04 RX ADMIN — PROCHLORPERAZINE EDISYLATE 10 MG: 5 INJECTION INTRAMUSCULAR; INTRAVENOUS at 13:52

## 2023-08-04 RX ADMIN — METOPROLOL SUCCINATE 25 MG: 25 TABLET, EXTENDED RELEASE ORAL at 15:24

## 2023-08-04 RX ADMIN — SENNOSIDES AND DOCUSATE SODIUM 1 TABLET: 50; 8.6 TABLET ORAL at 13:51

## 2023-08-04 RX ADMIN — SODIUM CHLORIDE, PRESERVATIVE FREE 10 ML: 5 INJECTION INTRAVENOUS at 22:47

## 2023-08-04 RX ADMIN — SENNOSIDES AND DOCUSATE SODIUM 1 TABLET: 50; 8.6 TABLET ORAL at 22:47

## 2023-08-04 RX ADMIN — ONDANSETRON 4 MG: 2 INJECTION INTRAMUSCULAR; INTRAVENOUS at 10:13

## 2023-08-04 RX ADMIN — IOPAMIDOL 100 ML: 755 INJECTION, SOLUTION INTRAVENOUS at 10:57

## 2023-08-04 ASSESSMENT — ENCOUNTER SYMPTOMS
ABDOMINAL PAIN: 0
NAUSEA: 1
CONSTIPATION: 1
SHORTNESS OF BREATH: 0
TROUBLE SWALLOWING: 0
COLOR CHANGE: 0

## 2023-08-04 ASSESSMENT — PAIN - FUNCTIONAL ASSESSMENT: PAIN_FUNCTIONAL_ASSESSMENT: NONE - DENIES PAIN

## 2023-08-04 NOTE — ED PROVIDER NOTES
(*)     AST 47 (*)     Albumin 3.4 (*)     Albumin/Globulin Ratio 0.9 (*)     All other components within normal limits   LIPASE - Abnormal; Notable for the following components:    Lipase 56 (*)     All other components within normal limits   URINALYSIS WITH REFLEX TO CULTURE - Abnormal; Notable for the following components:    Ketones, Urine 15 (*)     All other components within normal limits   EXTRA TUBES HOLD       All other labs were within normal range or not returned as of this dictation. EMERGENCY DEPARTMENT COURSE and DIFFERENTIAL DIAGNOSIS/MDM:   Vitals:    Vitals:    08/04/23 1345 08/04/23 1349 08/04/23 1400 08/04/23 1407   BP: (!) 140/80  136/67    Pulse:       Resp:       Temp:       TempSrc:       SpO2: 100% 100% 100% 99%   Weight:       Height:               Medical Decision Making  80-year-old male presents to the hospital with nausea and constipation since 7/26 when he had a total knee replacement. Abdominal exam without peritoneal signs. No evidence of acute abdomen at this time. Well appearing. Given work up have low suspicion for acute hepatobiliary disease (including acute cholecystitis or cholangitis), upper GI bleed, acute pancreatitis (neg lipase), gastric perforation, acute infectious processes (pneumonia, hepatitis, pyelonephritis), atypical appendicitis, vascular catastrophe, bowel obstruction or viscus perforation, or acute coronary syndrome. Patient CT scan showed a large stroke stool burden near the rectum. Patient was given an enema and passed some stool. Patient was disimpacted in the emergency department. It was also found that the patient had urinary retention. Patient reports that he is not previously had any urinary retention to this degree however he does take Flomax daily. Postvoid bladder scan showed 600 mL of urine retention. Straight cath was inserted which drained approximately 1000 mL.   CT scan also had an incidental finding of hypodensities in the pancreas for Ketones, Urine 15(!)   Bilirubin, Urine Negative   Blood, Urine Negative   Urobilinogen, Urine 1.0   Nitrite, Urine Negative   Leukocyte Esterase, Urine Negative   Urine Culture if Indicated CULTURE NOT INDICATED BY UA RESULT   WBC, UA 0-4   RBC, UA 0-5   Epithelial Cells, UA FEW   Bacteria, UA Negative   Hyaline Casts, UA 0-2  No evidence of UTI [TR]      ED Course User Index  [TR] Travis Giron PA-C           CONSULTS:  IP CONSULT TO HOSPITALIST    PROCEDURES:  Unless otherwise noted below, none     Procedures      Fecal Disimpaction  Performed by Mendoza De Leon PA-C  I discussed the risks and benefits of the procedure and the patient verbally consented. Manual disimpaction was performed. Stool present in the rectum was removed manually. Patient tolerated the procedure well and reported some improvement in symptoms after. FINAL IMPRESSION      1. Nausea    2. Generalized abdominal pain    3. Constipation, unspecified constipation type    4. Urinary retention          DISPOSITION/PLAN   DISPOSITION Admitted 08/04/2023 01:09:31 PM      PATIENT REFERRED TO:  No follow-up provider specified. DISCHARGE MEDICATIONS:  New Prescriptions    No medications on file     Perfect Serve Consult for Admission  2:29 PM    ED Room Number: ER20/20  Patient Name and age:  Mi Lewis 80 y.o.  male  Working Diagnosis:   1. Nausea    2. Generalized abdominal pain    3. Constipation, unspecified constipation type    4. Urinary retention        COVID-19 Suspicion: No  Sepsis present:  No  Reassessment needed: No  Code Status:  Full Code  Readmission: No  Isolation Requirements: no  Recommended Level of Care: med/surg  Department: [facility] ED - [phone]      Other: 77-year-old male presented to the emergency department with constipation, nausea and urinary retention. Patient CT showed a large stool burden in the distal colon for which patient will be given an enema for in the emergency department.  The CT scan also

## 2023-08-04 NOTE — ED NOTES
Soap suds enema given per order. Patient tolerated well and very small bowel movement post enema.       Rodrigo Mejia RN  08/04/23 5940

## 2023-08-04 NOTE — CARE COORDINATION
8/4/23  2:41 PM    Care Management Assessment      Reason for Admission:   Constipation [K59.00]    Assessment:   [x]In person with pt   []Via p/c with pt   []With family member in person. Who/Relation:     []With family member via p/c. Who/Relation:   []Chart Review    RUR: N/A- observation status  Risk Level: []Low []Moderate []High  Value-based purchasing: [] Yes [] No    Advance Directive: Full Code     [x] No AD on file. [] AD on file. [] Current AD not on file. Copy requested. [] Requests AD, and referral submitted to [facility]. Healthcare Decision Maker:   Samantha Kc- [place]- [phone]      Assessment:     08/04/23 1439   Service Assessment   Patient Orientation Alert and Oriented   Cognition Alert   History Provided By Patient   Primary Caregiver Self   Support Systems Other (Comment)  (girlfriend)   [address] is: Legal Next of [address] [place] [address]   PCP Verified by CM Yes   Last Visit to PCP Within last 6 months   Prior Functional Level Independent in ADLs/IADLs   Current Functional Level Independent in ADLs/IADLs   Can patient return to prior living arrangement Yes   Ability to make needs known: Good   Family able to assist with home care needs: Yes   Would you like for me to discuss the discharge plan with any other family members/significant others, and if so, who?  No   Financial Resources Medicare   Community Resources None   Social/Functional History   Lives With Alone   Type of [address] None   Receives Help From Friend(s)   ADL Assistance Independent   Homemaking Assistance Independent   Ambulation Assistance Independent   Transfer Assistance Independent   Active  Yes   Mode of Transportation Car   Occupation Retired   Discharge Planning   Living Arrangements [address] Medications No   Patient expects to be discharged to: House  (with girlfriend, Samantha Kc)   Services At/After Discharge   Mode of Transport at Discharge Other (see comment)  Bart Batista will transport  dc)   Confirm Follow Up Transport Self     Insurer: Active Insurance as of 8/4/2023       Primary Coverage       Payor Plan Insurance Group Employer/Plan Group    AETNA MEDICARE AETNA MEDICARE ADVANTAGE HMO [id number]       Payor Address Payor Phone Number Payor Fax Number Effective Dates    [address]   1/1/2023 - None Entered    [place] [id number]         [employer] [employer] Name [address] Birth Date Member ID       Zaria Roy 1940 [id number]                     PCP: Dr. Gareth Swanson   Address: [address] [facility]   Phone number:    Current patient: [x]Yes []No   Approximate date of last visit:   Access to virtual PCP visits: []Yes []No     Pharmacy: At DC will stay with girlfriend and should be sent [facility] [address] Transport: Shelley Chaidez    Transition of care plan:    [x] Home. No assistance required. CM following for dc needs. Patient normally lives alone but plans to stay with his girlfriend in [place] at [address]- address is [facility].  [place], [address] [facility]    [address] [facility]

## 2023-08-04 NOTE — ED TRIAGE NOTES
Pt to ED c/o constipation x 1 week and urinary retention x 2 days, not relieved with senna and miralax use x 2 days. Pt states he has been nauseated and unable to eat x 5 days.      Pt had R arthroplasty on 7/26 with Dr. Sameer Laboy in [place]

## 2023-08-04 NOTE — H&P
hydration       Normochromic normocytic anemia   Near baseline. Suspect anemia of CKD. - check iron panel, folate, B12     Hypertension   HLD   Paroxysmal atrial fibrillation  - continue losartan, statin, metoprolol, eliquis       DIET: ADULT DIET; Full Liquid   ISOLATION PRECAUTIONS: No active isolations  CODE STATUS: [unfilled]   DVT PROPHYLAXIS: Eliquis   ANTICIPATED DISCHARGE:  < 24 hours   ANTICIPATED DISPOSITION Home   EMERGENCY CONTACT/SURROGATE DECISION MAKER: Keyla Morris, significant other     Signed By: Kym Zavala MD     August 4, 2023         Please note that this dictation may have been completed with Shawna Barry, the computer voice recognition software. Quite often unanticipated grammatical, syntax, homophones, and other interpretive errors are inadvertently transcribed by the computer software. Please disregard these errors. Please excuse any errors that have escaped final proofreading.

## 2023-08-04 NOTE — ED NOTES
Report given to [facility]. Patient being transferred to 5th floor.       Gilmar Bond RN  08/04/23 8880

## 2023-08-05 VITALS
HEIGHT: 67 IN | WEIGHT: 168 LBS | TEMPERATURE: 97.8 F | OXYGEN SATURATION: 100 % | DIASTOLIC BLOOD PRESSURE: 77 MMHG | HEART RATE: 87 BPM | SYSTOLIC BLOOD PRESSURE: 126 MMHG | BODY MASS INDEX: 26.37 KG/M2 | RESPIRATION RATE: 17 BRPM

## 2023-08-05 LAB
ANION GAP SERPL CALC-SCNC: 3 MMOL/L (ref 5–15)
BASOPHILS # BLD: 0 K/UL (ref 0–0.1)
BASOPHILS NFR BLD: 0 % (ref 0–1)
BUN SERPL-MCNC: 19 MG/DL (ref 6–20)
BUN/CREAT SERPL: 14 (ref 12–20)
CALCIUM SERPL-MCNC: 9.2 MG/DL (ref 8.5–10.1)
CHLORIDE SERPL-SCNC: 107 MMOL/L (ref 97–108)
CO2 SERPL-SCNC: 26 MMOL/L (ref 21–32)
CREAT SERPL-MCNC: 1.35 MG/DL (ref 0.7–1.3)
DIFFERENTIAL METHOD BLD: ABNORMAL
EKG ATRIAL RATE: 182 BPM
EKG DIAGNOSIS: NORMAL
EKG Q-T INTERVAL: 320 MS
EKG QRS DURATION: 90 MS
EKG QTC CALCULATION (BAZETT): 420 MS
EKG R AXIS: 78 DEGREES
EKG T AXIS: 266 DEGREES
EKG VENTRICULAR RATE: 104 BPM
EOSINOPHIL # BLD: 0.2 K/UL (ref 0–0.4)
EOSINOPHIL NFR BLD: 2 % (ref 0–7)
ERYTHROCYTE [DISTWIDTH] IN BLOOD BY AUTOMATED COUNT: 13 % (ref 11.5–14.5)
GLUCOSE SERPL-MCNC: 111 MG/DL (ref 65–100)
HCT VFR BLD AUTO: 31.9 % (ref 36.6–50.3)
HGB BLD-MCNC: 10.5 G/DL (ref 12.1–17)
IMM GRANULOCYTES # BLD AUTO: 0.1 K/UL (ref 0–0.04)
IMM GRANULOCYTES NFR BLD AUTO: 1 % (ref 0–0.5)
LYMPHOCYTES # BLD: 1.4 K/UL (ref 0.8–3.5)
LYMPHOCYTES NFR BLD: 13 % (ref 12–49)
MAGNESIUM SERPL-MCNC: 2.2 MG/DL (ref 1.6–2.4)
MCH RBC QN AUTO: 30.8 PG (ref 26–34)
MCHC RBC AUTO-ENTMCNC: 32.9 G/DL (ref 30–36.5)
MCV RBC AUTO: 93.5 FL (ref 80–99)
MONOCYTES # BLD: 1 K/UL (ref 0–1)
MONOCYTES NFR BLD: 9 % (ref 5–13)
NEUTS SEG # BLD: 8.3 K/UL (ref 1.8–8)
NEUTS SEG NFR BLD: 75 % (ref 32–75)
NRBC # BLD: 0 K/UL (ref 0–0.01)
NRBC BLD-RTO: 0 PER 100 WBC
PHOSPHATE SERPL-MCNC: 3.5 MG/DL (ref 2.6–4.7)
PLATELET # BLD AUTO: 251 K/UL (ref 150–400)
PMV BLD AUTO: 10.5 FL (ref 8.9–12.9)
POTASSIUM SERPL-SCNC: 4.3 MMOL/L (ref 3.5–5.1)
RBC # BLD AUTO: 3.41 M/UL (ref 4.1–5.7)
SODIUM SERPL-SCNC: 136 MMOL/L (ref 136–145)
WBC # BLD AUTO: 11 K/UL (ref 4.1–11.1)

## 2023-08-05 PROCEDURE — 84100 ASSAY OF PHOSPHORUS: CPT

## 2023-08-05 PROCEDURE — 6370000000 HC RX 637 (ALT 250 FOR IP): Performed by: STUDENT IN AN ORGANIZED HEALTH CARE EDUCATION/TRAINING PROGRAM

## 2023-08-05 PROCEDURE — 80048 BASIC METABOLIC PNL TOTAL CA: CPT

## 2023-08-05 PROCEDURE — 83735 ASSAY OF MAGNESIUM: CPT

## 2023-08-05 PROCEDURE — 85025 COMPLETE CBC W/AUTO DIFF WBC: CPT

## 2023-08-05 PROCEDURE — 94761 N-INVAS EAR/PLS OXIMETRY MLT: CPT

## 2023-08-05 PROCEDURE — 36415 COLL VENOUS BLD VENIPUNCTURE: CPT

## 2023-08-05 PROCEDURE — 93010 ELECTROCARDIOGRAM REPORT: CPT | Performed by: SPECIALIST

## 2023-08-05 PROCEDURE — 2580000003 HC RX 258: Performed by: STUDENT IN AN ORGANIZED HEALTH CARE EDUCATION/TRAINING PROGRAM

## 2023-08-05 PROCEDURE — G0378 HOSPITAL OBSERVATION PER HR: HCPCS

## 2023-08-05 RX ADMIN — TAMSULOSIN HYDROCHLORIDE 0.4 MG: 0.4 CAPSULE ORAL at 10:19

## 2023-08-05 RX ADMIN — LOSARTAN POTASSIUM 100 MG: 50 TABLET, FILM COATED ORAL at 10:19

## 2023-08-05 RX ADMIN — ONDANSETRON 4 MG: 4 TABLET, ORALLY DISINTEGRATING ORAL at 11:44

## 2023-08-05 RX ADMIN — APIXABAN 5 MG: 5 TABLET, FILM COATED ORAL at 10:19

## 2023-08-05 RX ADMIN — PRAVASTATIN SODIUM 40 MG: 20 TABLET ORAL at 10:19

## 2023-08-05 RX ADMIN — SODIUM CHLORIDE, PRESERVATIVE FREE 10 ML: 5 INJECTION INTRAVENOUS at 10:22

## 2023-08-05 RX ADMIN — FINASTERIDE 5 MG: 5 TABLET, FILM COATED ORAL at 10:20

## 2023-08-05 ASSESSMENT — PAIN SCALES - GENERAL
PAINLEVEL_OUTOF10: 0
PAINLEVEL_OUTOF10: 0

## 2023-08-05 NOTE — DISCHARGE INSTRUCTIONS
ACUTE DIAGNOSES:  Urinary retention [R33.9]  Nausea [R11.0]  Generalized abdominal pain [R10.84]  Constipation [K59.00]  Constipation, unspecified constipation type [K59.00]    CHRONIC MEDICAL DIAGNOSES:  [unfilled]    DISCHARGE MEDICATIONS:   [unfilled]    It is important that you take the medication exactly as they are prescribed. Keep your medication in the bottles provided by the pharmacist and keep a list of the medication names, dosages, and times to be taken in your wallet. Do not take other medications without consulting your doctor. DIET:   high fiber diet     ACTIVITY: activity as tolerated    ADDITIONAL INFORMATION: If you experience any of the following symptoms then please call your primary care physician or return to the emergency room if you cannot get hold of your doctor: Fever, chills, nausea, vomiting, diarrhea, change in mentation, falling, bleeding, shortness of breath. FOLLOW UP CARE:   @@  you are to call and set up an appointment to see them in 5 days. Follow-up  No follow-up provider specified. Information obtained by :  I understand that if any problems occur once I am at home I am to contact my physician. I understand and acknowledge receipt of the instructions indicated above.                                                                                                                                            Physician's or R.N.'s Signature                                                                  Date/Time                                                                                                                                              Patient or Representative Signature                                                          Date/Time

## 2023-08-05 NOTE — DISCHARGE SUMMARY
Hospitalist Discharge Summary     Patient ID:    Celi Richter  [id number]  80 y.o.  1940    Admit date of service: 8/4/2023    Discharge date of service: 8/5/2023    Admission Diagnoses: Urinary retention [R33.9]  Nausea [R11.0]  Generalized abdominal pain [R10.84]  Constipation [K59.00]  Constipation, unspecified constipation type [K59.00]    Chronic Diagnoses:      Discharge Medications:   Current Discharge Medication List        CONTINUE these medications which have NOT CHANGED    Details   apixaban (ELIQUIS) 5 MG TABS tablet Take 1 tablet by mouth 2 times daily      docusate sodium (COLACE) 100 MG capsule Take 1 capsule by mouth 2 times daily      ezetimibe (ZETIA) 10 MG tablet Take 1 tablet by mouth daily      finasteride (PROSCAR) 5 MG tablet Take 1 tablet by mouth daily      losartan (COZAAR) 100 MG tablet Take 1 tablet by mouth daily      metoprolol succinate (TOPROL XL) 25 MG extended release tablet Take 1 tablet by mouth daily      pravastatin (PRAVACHOL) 40 MG tablet Take 1 tablet by mouth daily      tamsulosin (FLOMAX) 0.4 MG capsule Take 1 capsule by mouth daily           STOP taking these medications       oxyCODONE-acetaminophen (PERCOCET) 5-325 MG per tablet Comments:   Reason for Stopping: Follow up Care:    1. No follow-up provider specified. in 1-2 weeks  2. Diet:   high fiber diet     Disposition:  Home. Advanced Directive:    Discharge Exam:  See today's note. CONSULTATIONS: none    Significant Diagnostic Studies:   Recent Labs     08/04/23  0948 08/05/23  0152   WBC 8.9 11.0   HGB 11.8* 10.5*   HCT 33.9* 31.9*    251     Recent Labs     08/04/23  0948 08/05/23  0152   * 136   K 3.9 4.3    107   CO2 22 26   BUN 20 19   MG  --  2.2   PHOS  --  3.5     Recent Labs     08/04/23  0948   ALT 48   GLOB 3.9     No results for input(s): INR, APTT in the last 72 hours.     Invalid input(s): PTP   No results for input(s): TIBC, FERR in the last 72

## 2023-08-05 NOTE — PLAN OF CARE
Problem: Safety - Adult  Goal: Free from fall injury  8/4/2023 2056 by Tonya Palm RN  Outcome: Progressing       Problem: Skin/Tissue Integrity  Goal: Absence of new skin breakdown  Description: 1. Monitor for areas of redness and/or skin breakdown  2. Assess vascular access sites hourly  3. Every 4-6 hours minimum:  Change oxygen saturation probe site  4. Every 4-6 hours:  If on nasal continuous positive airway pressure, respiratory therapy assess nares and determine need for appliance change or resting period.   Outcome: Progressing

## 2023-08-05 NOTE — PLAN OF CARE
Problem: Safety - Adult  Goal: Free from fall injury  8/5/2023 1428 by Amaya Edward LPN  Outcome: Adequate for Discharge  8/5/2023 0249 by Diannah Severance, RN  Outcome: Progressing     Problem: Skin/Tissue Integrity  Goal: Absence of new skin breakdown  Description: 1. Monitor for areas of redness and/or skin breakdown  2. Assess vascular access sites hourly  3. Every 4-6 hours minimum:  Change oxygen saturation probe site  4. Every 4-6 hours:  If on nasal continuous positive airway pressure, respiratory therapy assess nares and determine need for appliance change or resting period.   8/5/2023 1428 by Amaya Edward LPN  Outcome: Adequate for Discharge  8/5/2023 0249 by Diannah Severance, RN  Outcome: Progressing     Problem: Pain  Goal: Verbalizes/displays adequate comfort level or baseline comfort level  Outcome: Adequate for Discharge

## 2023-08-22 ENCOUNTER — OFFICE VISIT (OUTPATIENT)
Age: 83
End: 2023-08-22

## 2023-08-22 VITALS — WEIGHT: 168 LBS | HEIGHT: 67 IN | BODY MASS INDEX: 26.37 KG/M2

## 2023-08-22 DIAGNOSIS — Z96.651 STATUS POST TOTAL RIGHT KNEE REPLACEMENT: ICD-10-CM

## 2023-08-22 DIAGNOSIS — M25.561 RIGHT KNEE PAIN, UNSPECIFIED CHRONICITY: Primary | ICD-10-CM

## 2023-08-22 PROCEDURE — 99024 POSTOP FOLLOW-UP VISIT: CPT

## 2023-08-22 NOTE — PATIENT INSTRUCTIONS
Post Operative Total Knee Replacement Instructions    PLEASE REMOVE YOUR LONG WHITE BANDAGE & STOCKING PRIOR TO CONNECTING TO YOUR APPOINTMENT       During your recovery from a total knee replacement, you will be participating in an OUTPATIENT physical therapy program. Your goal is to progress from a walker to a cane to nothing at all while walking, if possible, over the next 2 weeks. You can now shower and get your incision wet, pat it dry afterwards. No further dressing changes will be required as long as there is no drainage. You may take a bath 3 weeks post surgery as long as there is no drainage from your incision. You will see a clear surgical mesh tape over the incision. That mesh tape should come off in the next 2 weeks if it does not you may rub Vaseline over it to help break up the glue and remove it with ease. You may drive if you are not using any assistive devices to walk and are not using any narcotic pain medication. You may discontinue your aspirin (if that is your primary blood thinner prescribed by Dr. El Angelo ) when you are at least 2 weeks out from surgery and are no longer using a cane or walker. If you are still using assistive devices, please DO NOT stop the aspirin until you are completely off them. If you are on other blood thinners prescribed by another doctor please continue that until you are instructed to discontinue them. You and your physical therapist will determine when to stop your physical therapy program.    If you require a refill on a narcotic pain medication, please let Dr. El Angelo or his Nurse Practitioner know at your appointment today or AT LEAST 48 hours prior to needing it. No refills will be provided after hours or during weekends. If you experience any significant calf pain, swelling, or shortness of breath, please call our office immediately or go to the nearest emergency room.     If you notice any significant increase in swelling of the knee or leg,

## 2023-08-24 DIAGNOSIS — Z96.651 STATUS POST TOTAL RIGHT KNEE REPLACEMENT: Primary | ICD-10-CM

## 2023-08-30 ENCOUNTER — APPOINTMENT (OUTPATIENT)
Dept: URBAN - NONMETROPOLITAN AREA SURGERY 7 | Age: 83
Setting detail: DERMATOLOGY
End: 2023-08-30

## 2023-08-30 PROCEDURE — OTHER MIPS QUALITY: OTHER

## 2023-08-30 NOTE — PROCEDURE: MIPS QUALITY
Quality 130: Documentation Of Current Medications In The Medical Record: Current Medications Documented
Quality 111:Pneumonia Vaccination Status For Older Adults: Patient received any pneumococcal conjugate or polysaccharide vaccine on or after their 60th birthday and before the end of the measurement period
Quality 226: Preventive Care And Screening: Tobacco Use: Screening And Cessation Intervention: Patient screened for tobacco use and is an ex/non-smoker
Quality 110: Preventive Care And Screening: Influenza Immunization: Influenza Immunization Administered during Influenza season
Quality 47: Advance Care Plan: Advance Care Planning discussed and documented in the medical record; patient did not wish or was not able to name a surrogate decision maker or provide an advance care plan.
Detail Level: Detailed

## 2023-09-14 ENCOUNTER — APPOINTMENT (OUTPATIENT)
Dept: URBAN - NONMETROPOLITAN AREA SURGERY 7 | Age: 83
Setting detail: DERMATOLOGY
End: 2023-09-14

## 2023-09-14 DIAGNOSIS — D485 NEOPLASM OF UNCERTAIN BEHAVIOR OF SKIN: ICD-10-CM

## 2023-09-14 PROBLEM — D48.5 NEOPLASM OF UNCERTAIN BEHAVIOR OF SKIN: Status: ACTIVE | Noted: 2023-09-14

## 2023-09-14 PROCEDURE — OTHER MIPS QUALITY: OTHER

## 2023-09-14 PROCEDURE — 11104 PUNCH BX SKIN SINGLE LESION: CPT

## 2023-09-14 PROCEDURE — OTHER BIOPSY BY PUNCH METHOD: OTHER

## 2023-09-14 PROCEDURE — OTHER BIOPSY BY SHAVE METHOD: OTHER

## 2023-09-14 ASSESSMENT — LOCATION DETAILED DESCRIPTION DERM: LOCATION DETAILED: PERIUMBILICAL SKIN

## 2023-09-14 ASSESSMENT — LOCATION SIMPLE DESCRIPTION DERM: LOCATION SIMPLE: ABDOMEN

## 2023-09-14 ASSESSMENT — LOCATION ZONE DERM: LOCATION ZONE: TRUNK

## 2023-09-29 ENCOUNTER — APPOINTMENT (OUTPATIENT)
Dept: URBAN - NONMETROPOLITAN AREA SURGERY 7 | Age: 83
Setting detail: DERMATOLOGY
End: 2023-10-02

## 2023-09-29 DIAGNOSIS — Z48.02 ENCOUNTER FOR REMOVAL OF SUTURES: ICD-10-CM

## 2023-09-29 PROCEDURE — OTHER SUTURE REMOVAL (GLOBAL PERIOD): OTHER

## 2023-09-29 PROCEDURE — OTHER MIPS QUALITY: OTHER

## 2023-09-29 ASSESSMENT — LOCATION ZONE DERM: LOCATION ZONE: TRUNK

## 2023-09-29 ASSESSMENT — LOCATION DETAILED DESCRIPTION DERM: LOCATION DETAILED: PERIUMBILICAL SKIN

## 2023-09-29 ASSESSMENT — LOCATION SIMPLE DESCRIPTION DERM: LOCATION SIMPLE: ABDOMEN

## 2023-09-29 NOTE — PROCEDURE: SUTURE REMOVAL (GLOBAL PERIOD)
Detail Level: Detailed
Add 69602 Cpt? (Important Note: In 2017 The Use Of 91595 Is Being Tracked By Cms To Determine Future Global Period Reimbursement For Global Periods): no

## 2023-10-25 ENCOUNTER — APPOINTMENT (OUTPATIENT)
Dept: URBAN - NONMETROPOLITAN AREA SURGERY 7 | Age: 83
Setting detail: DERMATOLOGY
End: 2023-10-26

## 2023-10-25 DIAGNOSIS — Z48.817 ENCOUNTER FOR SURGICAL AFTERCARE FOLLOWING SURGERY ON THE SKIN AND SUBCUTANEOUS TISSUE: ICD-10-CM

## 2023-10-25 PROCEDURE — OTHER POST-OP WOUND CHECK: OTHER

## 2023-10-25 PROCEDURE — OTHER MIPS QUALITY: OTHER

## 2023-10-25 PROCEDURE — 99024 POSTOP FOLLOW-UP VISIT: CPT

## 2023-10-25 ASSESSMENT — LOCATION ZONE DERM: LOCATION ZONE: TRUNK

## 2023-10-25 ASSESSMENT — LOCATION DETAILED DESCRIPTION DERM: LOCATION DETAILED: PERIUMBILICAL SKIN

## 2023-10-25 ASSESSMENT — LOCATION SIMPLE DESCRIPTION DERM: LOCATION SIMPLE: ABDOMEN

## 2023-10-25 NOTE — PROCEDURE: POST-OP WOUND CHECK
Detail Level: Detailed
Additional Comments: Surgical wound evaluated by physician, with attention to form and function, in addition to cosmetic outcome and any possible additional management options such as delayed repair or scar revision. All of patient's concerns and questions were entertained to patient's satisfaction. Based on wound healing stage, wound care strategies reviewed as relevant: massage, sun-protection, and if still open, open-wound care instructions.
Wound Evaluated By: KAT Rush DO
Add 80533 Cpt? (Important Note: In 2017 The Use Of 29484 Is Being Tracked By Cms To Determine Future Global Period Reimbursement For Global Periods): yes

## 2024-03-18 ENCOUNTER — APPOINTMENT (OUTPATIENT)
Dept: URBAN - NONMETROPOLITAN AREA SURGERY 7 | Age: 84
Setting detail: DERMATOLOGY
End: 2024-03-19

## 2024-03-18 DIAGNOSIS — L82.0 INFLAMED SEBORRHEIC KERATOSIS: ICD-10-CM

## 2024-03-18 DIAGNOSIS — L57.0 ACTINIC KERATOSIS: ICD-10-CM

## 2024-03-18 DIAGNOSIS — D485 NEOPLASM OF UNCERTAIN BEHAVIOR OF SKIN: ICD-10-CM

## 2024-03-18 DIAGNOSIS — L85.3 XEROSIS CUTIS: ICD-10-CM

## 2024-03-18 DIAGNOSIS — Z85.828 PERSONAL HISTORY OF OTHER MALIGNANT NEOPLASM OF SKIN: ICD-10-CM

## 2024-03-18 DIAGNOSIS — D22 MELANOCYTIC NEVI: ICD-10-CM

## 2024-03-18 DIAGNOSIS — L81.4 OTHER MELANIN HYPERPIGMENTATION: ICD-10-CM

## 2024-03-18 DIAGNOSIS — D18.0 HEMANGIOMA: ICD-10-CM

## 2024-03-18 DIAGNOSIS — R23.3 SPONTANEOUS ECCHYMOSES: ICD-10-CM

## 2024-03-18 DIAGNOSIS — L90.5 SCAR CONDITIONS AND FIBROSIS OF SKIN: ICD-10-CM

## 2024-03-18 DIAGNOSIS — L82.1 OTHER SEBORRHEIC KERATOSIS: ICD-10-CM

## 2024-03-18 PROBLEM — D48.5 NEOPLASM OF UNCERTAIN BEHAVIOR OF SKIN: Status: ACTIVE | Noted: 2024-03-18

## 2024-03-18 PROBLEM — D22.61 MELANOCYTIC NEVI OF RIGHT UPPER LIMB, INCLUDING SHOULDER: Status: ACTIVE | Noted: 2024-03-18

## 2024-03-18 PROBLEM — D22.71 MELANOCYTIC NEVI OF RIGHT LOWER LIMB, INCLUDING HIP: Status: ACTIVE | Noted: 2024-03-18

## 2024-03-18 PROBLEM — D18.01 HEMANGIOMA OF SKIN AND SUBCUTANEOUS TISSUE: Status: ACTIVE | Noted: 2024-03-18

## 2024-03-18 PROBLEM — D22.72 MELANOCYTIC NEVI OF LEFT LOWER LIMB, INCLUDING HIP: Status: ACTIVE | Noted: 2024-03-18

## 2024-03-18 PROCEDURE — 17003 DESTRUCT PREMALG LES 2-14: CPT | Mod: 59

## 2024-03-18 PROCEDURE — 17000 DESTRUCT PREMALG LESION: CPT | Mod: 59

## 2024-03-18 PROCEDURE — OTHER LIQUID NITROGEN: OTHER

## 2024-03-18 PROCEDURE — OTHER MIPS QUALITY: OTHER

## 2024-03-18 PROCEDURE — OTHER COUNSELING: OTHER

## 2024-03-18 PROCEDURE — 11103 TANGNTL BX SKIN EA SEP/ADDL: CPT | Mod: 59

## 2024-03-18 PROCEDURE — OTHER BIOPSY BY SHAVE METHOD: OTHER

## 2024-03-18 PROCEDURE — 11102 TANGNTL BX SKIN SINGLE LES: CPT | Mod: 59

## 2024-03-18 PROCEDURE — 17110 DESTRUCT B9 LESION 1-14: CPT

## 2024-03-18 PROCEDURE — 99213 OFFICE O/P EST LOW 20 MIN: CPT | Mod: 25

## 2024-03-18 ASSESSMENT — LOCATION ZONE DERM
LOCATION ZONE: AXILLAE
LOCATION ZONE: FACE
LOCATION ZONE: ARM
LOCATION ZONE: LEG
LOCATION ZONE: SCALP
LOCATION ZONE: EAR
LOCATION ZONE: LEG
LOCATION ZONE: ARM
LOCATION ZONE: TRUNK
LOCATION ZONE: TRUNK
LOCATION ZONE: FACE
LOCATION ZONE: HAND
LOCATION ZONE: SCALP
LOCATION ZONE: NECK
LOCATION ZONE: NECK
LOCATION ZONE: AXILLAE
LOCATION ZONE: EAR
LOCATION ZONE: HAND

## 2024-03-18 ASSESSMENT — LOCATION DETAILED DESCRIPTION DERM
LOCATION DETAILED: EPIGASTRIC SKIN
LOCATION DETAILED: LEFT CENTRAL POSTAURICULAR SKIN
LOCATION DETAILED: LEFT DISTAL POSTERIOR THIGH
LOCATION DETAILED: RIGHT AXILLARY VAULT
LOCATION DETAILED: RIGHT DISTAL DORSAL FOREARM
LOCATION DETAILED: POSTERIOR MID-PARIETAL SCALP
LOCATION DETAILED: INFERIOR THORACIC SPINE
LOCATION DETAILED: RIGHT ANTERIOR DISTAL UPPER ARM
LOCATION DETAILED: RIGHT ANTERIOR PROXIMAL THIGH
LOCATION DETAILED: LEFT PROXIMAL PRETIBIAL REGION
LOCATION DETAILED: RIGHT ANTERIOR SHOULDER
LOCATION DETAILED: LEFT MEDIAL UPPER BACK
LOCATION DETAILED: LEFT POSTERIOR SHOULDER
LOCATION DETAILED: LEFT SUPERIOR PREAURICULAR CHEEK
LOCATION DETAILED: LEFT VENTRAL DISTAL FOREARM
LOCATION DETAILED: LEFT SUPERIOR HELIX
LOCATION DETAILED: LEFT POSTERIOR SHOULDER
LOCATION DETAILED: RIGHT PROXIMAL POSTERIOR UPPER ARM
LOCATION DETAILED: LEFT CLAVICULAR NECK
LOCATION DETAILED: LEFT CENTRAL POSTAURICULAR SKIN
LOCATION DETAILED: LEFT MEDIAL INFERIOR CHEST
LOCATION DETAILED: RIGHT SUPERIOR MEDIAL UPPER BACK
LOCATION DETAILED: LEFT CLAVICULAR SKIN
LOCATION DETAILED: LEFT ANTERIOR PROXIMAL UPPER ARM
LOCATION DETAILED: LEFT DISTAL DORSAL FOREARM
LOCATION DETAILED: LEFT INFERIOR MEDIAL MIDBACK
LOCATION DETAILED: RIGHT ANTERIOR PROXIMAL UPPER ARM
LOCATION DETAILED: LEFT SUPERIOR HELIX
LOCATION DETAILED: LEFT CLAVICULAR SKIN
LOCATION DETAILED: RIGHT ULNAR DORSAL HAND
LOCATION DETAILED: LEFT ANTERIOR DISTAL THIGH
LOCATION DETAILED: LEFT SUPERIOR PREAURICULAR CHEEK
LOCATION DETAILED: LEFT ANTERIOR SHOULDER
LOCATION DETAILED: PERIUMBILICAL SKIN
LOCATION DETAILED: RIGHT INFERIOR MEDIAL UPPER BACK
LOCATION DETAILED: RIGHT DISTAL CALF
LOCATION DETAILED: LEFT SUPERIOR MEDIAL LOWER BACK
LOCATION DETAILED: LEFT DISTAL CALF
LOCATION DETAILED: LEFT DISTAL CALF
LOCATION DETAILED: RIGHT ULNAR DORSAL HAND
LOCATION DETAILED: LEFT PROXIMAL DORSAL FOREARM
LOCATION DETAILED: LEFT PROXIMAL POSTERIOR UPPER ARM
LOCATION DETAILED: LEFT ANTERIOR PROXIMAL THIGH
LOCATION DETAILED: LEFT CLAVICULAR NECK
LOCATION DETAILED: RIGHT DISTAL POSTERIOR THIGH
LOCATION DETAILED: RIGHT VENTRAL PROXIMAL FOREARM
LOCATION DETAILED: LEFT BUTTOCK
LOCATION DETAILED: PERIUMBILICAL SKIN
LOCATION DETAILED: RIGHT RADIAL DORSAL HAND
LOCATION DETAILED: RIGHT PROXIMAL PRETIBIAL REGION
LOCATION DETAILED: STERNUM
LOCATION DETAILED: RIGHT DISTAL DORSAL FOREARM
LOCATION DETAILED: LEFT ANTERIOR DISTAL UPPER ARM
LOCATION DETAILED: RIGHT AXILLARY VAULT
LOCATION DETAILED: LEFT SUPERIOR MEDIAL UPPER BACK
LOCATION DETAILED: RIGHT BUTTOCK
LOCATION DETAILED: RIGHT CLAVICULAR NECK
LOCATION DETAILED: RIGHT POSTERIOR SHOULDER
LOCATION DETAILED: MID-FRONTAL SCALP

## 2024-03-18 ASSESSMENT — LOCATION SIMPLE DESCRIPTION DERM
LOCATION SIMPLE: RIGHT PRETIBIAL REGION
LOCATION SIMPLE: RIGHT SHOULDER
LOCATION SIMPLE: RIGHT FOREARM
LOCATION SIMPLE: LEFT LOWER BACK
LOCATION SIMPLE: LEFT SHOULDER
LOCATION SIMPLE: LEFT BUTTOCK
LOCATION SIMPLE: RIGHT THIGH
LOCATION SIMPLE: LEFT SHOULDER
LOCATION SIMPLE: CHEST
LOCATION SIMPLE: POSTERIOR SCALP
LOCATION SIMPLE: RIGHT AXILLARY VAULT
LOCATION SIMPLE: RIGHT AXILLARY VAULT
LOCATION SIMPLE: RIGHT POSTERIOR THIGH
LOCATION SIMPLE: RIGHT CALF
LOCATION SIMPLE: LEFT UPPER BACK
LOCATION SIMPLE: LEFT ANTERIOR NECK
LOCATION SIMPLE: RIGHT FOREARM
LOCATION SIMPLE: RIGHT UPPER BACK
LOCATION SIMPLE: LEFT EAR
LOCATION SIMPLE: ANTERIOR SCALP
LOCATION SIMPLE: LEFT ANTERIOR NECK
LOCATION SIMPLE: RIGHT BUTTOCK
LOCATION SIMPLE: LEFT UPPER ARM
LOCATION SIMPLE: SCALP
LOCATION SIMPLE: LEFT THIGH
LOCATION SIMPLE: RIGHT ANTERIOR NECK
LOCATION SIMPLE: LEFT CHEEK
LOCATION SIMPLE: LEFT EAR
LOCATION SIMPLE: UPPER BACK
LOCATION SIMPLE: LEFT PRETIBIAL REGION
LOCATION SIMPLE: LEFT CHEEK
LOCATION SIMPLE: LEFT CLAVICULAR SKIN
LOCATION SIMPLE: LEFT CALF
LOCATION SIMPLE: ABDOMEN
LOCATION SIMPLE: LEFT FOREARM
LOCATION SIMPLE: ABDOMEN
LOCATION SIMPLE: LEFT CLAVICULAR SKIN
LOCATION SIMPLE: SCALP
LOCATION SIMPLE: RIGHT UPPER ARM
LOCATION SIMPLE: RIGHT HAND
LOCATION SIMPLE: LEFT CALF
LOCATION SIMPLE: RIGHT HAND
LOCATION SIMPLE: LEFT POSTERIOR THIGH
LOCATION SIMPLE: CHEST

## 2024-03-18 NOTE — PROCEDURE: LIQUID NITROGEN
Show Spray Paint Technique Variable?: Yes
Medical Necessity Information: It is in your best interest to select a reason for this procedure from the list below. All of these items fulfill various CMS LCD requirements except the new and changing color options.
Detail Level: Detailed
Render Post-Care Instructions In Note?: no
Post-Care Instructions: I reviewed with the patient in detail post-care instructions. Patient is to wear sunprotection, and avoid picking at any of the treated lesions. Pt may apply Vaseline to crusted or scabbing areas.
Spray Paint Text: The liquid nitrogen was applied to the skin utilizing a spray paint frosting technique.
Number Of Freeze-Thaw Cycles: 2 freeze-thaw cycles
Consent: The patient's consent was obtained including but not limited to risks of crusting, scabbing, blistering, scarring, darker or lighter pigmentary change, recurrence, incomplete removal and infection.
Medical Necessity Clause: This procedure was medically necessary because the lesions that were treated were:
Number Of Freeze-Thaw Cycles: 1 freeze-thaw cycle
Duration Of Freeze Thaw-Cycle (Seconds): 0
Application Tool (Optional): Cry-AC

## 2024-03-18 NOTE — PROCEDURE: COUNSELING
Detail Level: Detailed
Patient Specific Counseling (Will Not Stick From Patient To Patient): 0.5x 0.4 will send message to  to have area removed

## 2024-03-18 NOTE — PROCEDURE: BIOPSY BY SHAVE METHOD
Body Location Override (Optional - Billing Will Still Be Based On Selected Body Map Location If Applicable): mid vertex scalp
Detail Level: Detailed
Depth Of Biopsy: dermis
Was A Bandage Applied: Yes
Size Of Lesion In Cm: 1
Biopsy Type: H and E
Biopsy Method: Dermablade
Anesthesia Type: 1% lidocaine with epinephrine
Anesthesia Volume In Cc: 0.5
Additional Anesthesia Volume In Cc (Will Not Render If 0): 0
Hemostasis: Aluminum Chloride
Wound Care: Petrolatum
Dressing: bandage
Destruction After The Procedure: No
Type Of Destruction Used: Curettage
Curettage Text: The wound bed was treated with curettage after the biopsy was performed.
Cryotherapy Text: The wound bed was treated with cryotherapy after the biopsy was performed.
Electrodesiccation Text: The wound bed was treated with electrodesiccation after the biopsy was performed.
Electrodesiccation And Curettage Text: The wound bed was treated with electrodesiccation and curettage after the biopsy was performed.
Silver Nitrate Text: The wound bed was treated with silver nitrate after the biopsy was performed.
Lab: [id number]
Consent: Written consent was obtained and risks were reviewed including but not limited to scarring, infection, bleeding, scabbing, incomplete removal, nerve damage and allergy to anesthesia.
Post-Care Instructions: I reviewed with the patient in detail post-care instructions. Patient is to keep the biopsy site dry overnight, and then apply bacitracin twice daily until healed. Patient may apply hydrogen peroxide soaks to remove any crusting.
Notification Instructions: Patient will be notified of biopsy results. However, patient instructed to call the office if not contacted within 2 weeks.
Billing Type: Third-Party Bill
Information: Selecting Yes will display possible errors in your note based on the variables you have selected. This validation is only offered as a suggestion for you. PLEASE NOTE THAT THE VALIDATION TEXT WILL BE REMOVED WHEN YOU FINALIZE YOUR NOTE. IF YOU WANT TO FAX A PRELIMINARY NOTE YOU WILL NEED TO TOGGLE THIS TO 'NO' IF YOU DO NOT WANT IT IN YOUR FAXED NOTE.
Body Location Override (Optional - Billing Will Still Be Based On Selected Body Map Location If Applicable): left side burn
X Size Of Lesion In Cm: 1.1
Body Location Override (Optional - Billing Will Still Be Based On Selected Body Map Location If Applicable): left mid calf
Size Of Lesion In Cm: 2

## 2024-05-14 ENCOUNTER — APPOINTMENT (OUTPATIENT)
Dept: URBAN - NONMETROPOLITAN AREA SURGERY 7 | Age: 84
Setting detail: DERMATOLOGY
End: 2024-05-14

## 2024-05-14 VITALS
SYSTOLIC BLOOD PRESSURE: 123 MMHG | HEART RATE: 70 BPM | TEMPERATURE: 97.8 F | RESPIRATION RATE: 20 BRPM | DIASTOLIC BLOOD PRESSURE: 65 MMHG

## 2024-05-14 PROBLEM — C44.719 BASAL CELL CARCINOMA OF SKIN OF LEFT LOWER LIMB, INCLUDING HIP: Status: ACTIVE | Noted: 2024-05-14

## 2024-05-14 PROBLEM — D04.4 CARCINOMA IN SITU OF SKIN OF SCALP AND NECK: Status: ACTIVE | Noted: 2024-05-14

## 2024-05-14 PROCEDURE — OTHER MIPS QUALITY: OTHER

## 2024-05-14 PROCEDURE — 12032 INTMD RPR S/A/T/EXT 2.6-7.5: CPT

## 2024-05-14 PROCEDURE — 99213 OFFICE O/P EST LOW 20 MIN: CPT | Mod: 57

## 2024-05-14 PROCEDURE — OTHER MEDICATION COUNSELING: OTHER

## 2024-05-14 PROCEDURE — OTHER PRESCRIPTION: OTHER

## 2024-05-14 PROCEDURE — OTHER SURGICAL DECISION MAKING: OTHER

## 2024-05-14 PROCEDURE — 17311 MOHS 1 STAGE H/N/HF/G: CPT

## 2024-05-14 PROCEDURE — OTHER MOHS SURGERY: OTHER

## 2024-05-14 PROCEDURE — OTHER COUNSELING: OTHER

## 2024-05-14 RX ORDER — FLUOROURACIL 2 G/40G
CREAM TOPICAL BID
Qty: 40 | Refills: 1 | Status: ERX | COMMUNITY
Start: 2024-05-14

## 2024-05-14 NOTE — PROCEDURE: MOHS SURGERY
Quadrants Reporting?: 0
Referred To Mid-Level For Closure Text (Leave Blank If You Do Not Want): After obtaining clear surgical margins the patient was sent to a mid-level provider for surgical repair.  The patient understands they will receive post-surgical care and follow-up from the mid-level provider.
Spiral Flap Text: Due to geometric and functional constraints, a flap reconstruction was performed to reconstruct the defect.  To that end, adjacent tissue was incised and carried over to close the defect in the following manner: The defect edges were debeveled with a #15 scalpel blade.  Given the location of the defect, shape of the defect and the proximity to free margins a spiral flap was deemed most appropriate.  Using a sterile surgical marker, an appropriate rotation flap was drawn incorporating the defect and placing the expected incisions within the relaxed skin tension lines where possible. The area thus outlined was incised deep to adipose tissue with a #15 scalpel blade.  The skin margins were undermined to an appropriate distance in all directions utilizing iris scissors.
Cheek Interpolation Flap Text: Due to geometric and functional constraints, a flap reconstruction was performed to reconstruct the defect.  To that end, adjacent tissue was incised and carried over to close the defect in the following manner: Due to geometric and functional constraints, a flap reconstruction was performed to reconstruct the defect.  To that end, adjacent tissue was incised and carried over to close the defect in the following manner: A decision was made to reconstruct the defect utilizing an interpolation axial flap and a staged reconstruction.  A telfa template was made of the defect.  This telfa template was then used to outline the Cheek Interpolation flap.  The donor area for the pedicle flap was then injected with anesthesia.  The flap was excised through the skin and subcutaneous tissue down to the layer of the underlying musculature.  The interpolation flap was carefully excised within this deep plane to maintain its blood supply.  The edges of the donor site were undermined.   The donor site was closed in a primary fashion.  The pedicle was then rotated into position and sutured.  Once the tube was sutured into place, adequate blood supply was confirmed with blanching and refill.  The pedicle was then wrapped with xeroform gauze and dressed appropriately with a telfa and gauze bandage to ensure continued blood supply and protect the attached pedicle.
Consent (Scalp)/Introductory Paragraph: The rationale for Mohs was explained to the patient and consent was obtained. The risks, benefits and alternatives to therapy were discussed in detail. Specifically, the risks of changes in hair growth pattern secondary to repair, infection, scarring, bleeding, prolonged wound healing, incomplete removal, allergy to anesthesia, nerve injury and recurrence were addressed. Prior to the procedure, the treatment site was clearly identified and confirmed by the patient. All components of Universal Protocol/PAUSE Rule completed.
Helical Rim Advancement Flap Text: Due to geometric and functional constraints, a flap reconstruction was performed to reconstruct the defect.  To that end, adjacent tissue was incised and carried over to close the defect in the following manner: The defect edges were debeveled with a #15 blade scalpel.  Given the location of the defect and the proximity to free margins (helical rim) a double helical rim advancement flap was deemed most appropriate.  Using a sterile surgical marker, the appropriate advancement flaps were drawn incorporating the defect and placing the expected incisions between the helical rim and antihelix where possible.  The area thus outlined was incised through and through with a #15 scalpel blade.  With a skin hook and iris scissors, the flaps were gently and sharply undermined and freed up.
Stage 6: Additional Anesthesia Type: 1% lidocaine with epinephrine
Scc Well Differentiated Histology Text: There were aggregates of well-differentiated squamous cells.
Surgeon: Mary Rush, DO
Special Stains Stage 13 - Results: Base On Clearance Noted Above
Quadrant Reporting?: no
Hemigard Postcare Instructions: The HEMIGARD strips are to remain completely dry for at least 5-7 days.
Scc Spindle Histology Text: There were aggregates of spindle-like squamous cells.
Body Location Override (Optional - Billing Will Still Be Based On Selected Body Map Location If Applicable): mid vertex scalp
Incidental Actinic Keratosis Histology Text: Incidental AK was noted at the periphery of the Mohs section destruction was performed, pt was was advised to monitor, and/or patient was advised to considered topical 5FU once fully healed.
Show Home Suture Removal Variable: Yes
Hemigard Intro: Due to skin fragility and wound tension, it was decided to use HEMIGARD adhesive retention suture devices to permit a linear closure. The skin was cleaned and dried for a 6cm distance away from the wound. Excessive hair, if present, was removed to allow for adhesion.
Referred To Otolaryngology For Closure Text (Leave Blank If You Do Not Want): After obtaining clear surgical margins the patient was sent to otolaryngology for surgical repair.  The patient understands they will receive post-surgical care and follow-up from the referring physician's office.
Scc Pigmented Histology Text: There were aggregates of squamous cells with pigment.
O-L Flap Text: Due to geometric and functional constraints, a flap reconstruction was performed to reconstruct the defect.  To that end, adjacent tissue was incised and carried over to close the defect in the following manner: The defect edges were debeveled with a #15 scalpel blade.  Given the location of the defect, shape of the defect and the proximity to free margins an O-L flap was deemed most appropriate.  Using a sterile surgical marker, an appropriate advancement flap was drawn incorporating the defect and placing the expected incisions within the relaxed skin tension lines where possible.    The area thus outlined was incised deep to adipose tissue with a #15 scalpel blade.  The skin margins were undermined to an appropriate distance in all directions utilizing iris scissors.
Provider Procedure Text (F): After obtaining clear surgical margins the defect was repaired by another provider.
Nasalis-Muscle-Based Myocutaneous Island Pedicle Flap Text: Due to geometric and functional constraints, a flap reconstruction was performed to reconstruct the defect.  To that end, adjacent tissue was incised and carried over to close the defect in the following manner: Using a #15 blade, an incision was made around the donor flap to the level of the nasalis muscle. Wide lateral undermining was then performed in both the subcutaneous plane above the nasalis muscle, and in a submuscular plane just above periosteum. This allowed the formation of a free nasalis muscle axial pedicle (based on the angular artery) which was still attached to the actual cutaneous flap, increasing its mobility and vascular viability. Hemostasis was obtained with pinpoint electrocoagulation. The flap was mobilized into position and the pivotal anchor points positioned and stabilized with buried interrupted sutures. Subcutaneous and dermal tissues were closed in a multilayered fashion with sutures. Tissue redundancies were excised, and the epidermal edges were apposed without significant tension and sutured with sutures.
Plastic Surgeon Procedure Text (C): After obtaining clear surgical margins the patient was sent to plastics for surgical repair.  The patient understands they will receive post-surgical care and follow-up from the referring physician's office.
M-Plasty Complex Repair Preamble Text (Leave Blank If You Do Not Want): Extensive wide undermining was performed.
Mohs Histo Method Verbiage: Each section was then chromacoded and processed in the Mohs lab using the Mohs protocol and submitted for frozen section.
Repair Hemostasis (Optional): Electrocoagulation
Bcc Micronodular Histology Text: There were numerous small/micronodular aggregates of palisaded basaloid cells with peripheral clefting
Suture Removal: 14 days
Xenograft Text: The defect edges were debeveled with a #15 scalpel blade.  Given the location of the defect, shape of the defect and the proximity to free margins a xenograft was deemed most appropriate.  The graft was then trimmed to fit the size of the defect.  The graft was then placed in the primary defect and oriented appropriately.
No Residual Tumor Seen Histology Text: There were no malignant cells seen in the sections examined.
Scc Crateriform Histology Text: There were aggregates of squamous cells.
V-Y Plasty Text: The defect edges were debeveled with a #15 scalpel blade.  Given the location of the defect, shape of the defect and the proximity to free margins an V-Y advancement flap was deemed most appropriate.  Using a sterile surgical marker, an appropriate advancement flap was drawn incorporating the defect and placing the expected incisions within the relaxed skin tension lines where possible.    The area thus outlined was incised deep to adipose tissue with a #15 scalpel blade.  The skin margins were undermined to an appropriate distance in all directions utilizing iris scissors.
Nasal Turnover Hinge Flap Text: Due to geometric and functional constraints, a flap reconstruction was performed to reconstruct the defect.  To that end, adjacent tissue was incised and carried over to close the defect in the following manner: The defect edges were debeveled with a #15 scalpel blade.  Given the size, depth, location of the defect and the defect being full thickness a nasal turnover hinge flap was deemed most appropriate.  Using a sterile surgical marker, an appropriate hinge flap was drawn incorporating the defect. The area thus outlined was incised with a #15 scalpel blade. The flap was designed to recreate the nasal mucosal lining and the alar rim. The skin margins were undermined to an appropriate distance in all directions utilizing iris scissors.
Scc Poorly Differentiated Histology Text: There were aggregates of poorly-differentiated squamous cells.
Presence Of Scar Tissue (For Histology): absent
Tissue Cultured Epidermal Autograft Text: The defect edges were debeveled with a #15 scalpel blade.  Given the location of the defect, shape of the defect and the proximity to free margins a tissue cultured epidermal autograft was deemed most appropriate.  The graft was then trimmed to fit the size of the defect.  The graft was then placed in the primary defect and oriented appropriately.
Island Pedicle Flap With Canthal Suspension Text: Due to geometric and functional constraints, a flap reconstruction was performed to reconstruct the defect.  To that end, adjacent tissue was incised and carried over to close the defect in the following manner: The defect edges were debeveled with a #15 scalpel blade.  Given the location of the defect, shape of the defect and the proximity to free margins an island pedicle advancement flap was deemed most appropriate.  Using a sterile surgical marker, an appropriate advancement flap was drawn incorporating the defect, outlining the appropriate donor tissue and placing the expected incisions within the relaxed skin tension lines where possible. The area thus outlined was incised deep to adipose tissue with a #15 scalpel blade.  The skin margins were undermined to an appropriate distance in all directions around the primary defect and laterally outward around the island pedicle utilizing iris scissors.  There was minimal undermining beneath the pedicle flap. A suspension suture was placed in the canthal tendon to prevent tension and prevent ectropion.
Post-Care Instructions: I reviewed with the patient in detail post-care instructions. Patient is not to engage in any heavy lifting, exercise, or swimming for the next 14 days. Should the patient develop any fevers, chills, bleeding, severe pain patient will contact the office immediately.
O-T Plasty Text: Due to geometric and functional constraints, a flap reconstruction was performed to reconstruct the defect.  To that end, adjacent tissue was incised and carried over to close the defect in the following manner: The defect edges were debeveled with a #15 scalpel blade.  Given the location of the defect, shape of the defect and the proximity to free margins an O-T plasty was deemed most appropriate.  Using a sterile surgical marker, an appropriate O-T plasty was drawn incorporating the defect and placing the expected incisions within the relaxed skin tension lines where possible.    The area thus outlined was incised deep to adipose tissue with a #15 scalpel blade.  The skin margins were undermined to an appropriate distance in all directions utilizing iris scissors.
Primary Defect Width In Cm (Final Defect Size - Required For Flaps/Grafts): 1
Bcc Adenoid Histology Text: There were aggregates of basaloid cells demonstrating an adenoid or cribiform pattern.
Asc Procedure Text (C): After obtaining clear surgical margins the patient was sent to an ASC for surgical repair.  The patient understands they will receive post-surgical care and follow-up from the ASC physician.
Area M Indication Text: Tumors in this location are included in Area M (cheek, forehead, scalp, neck, jawline and pretibial skin).  Mohs surgery is indicated for tumors in these anatomic locations.  The size of x cm plus 2 mm margin of excision equals tumor size of at least x cm and meets criteria for Mohs surgery for this location according to CPT definition of malignant tumor size that includes margin of excision.  It also meets appropriate use criteria (AUC) for Mohs surgery.
Undermining Type: Entire Wound
Bcc  Nodular Histology Text: There were numerous large/nodular aggregates of palisaded basaloid cells with peripheral clefting
Ck7 - Negative Histology Text: CK staining demonstrates a normal density and pattern.
Double Island Pedicle Flap Text: Due to geometric and functional constraints, a flap reconstruction was performed to reconstruct the defect.  To that end, adjacent tissue was incised and carried over to close the defect in the following manner: The defect edges were debeveled with a #15 scalpel blade.  Given the location of the defect, shape of the defect and the proximity to free margins a double island pedicle advancement flap was deemed most appropriate.  Using a sterile surgical marker, an appropriate advancement flap was drawn incorporating the defect, outlining the appropriate donor tissue and placing the expected incisions within the relaxed skin tension lines where possible.    The area thus outlined was incised deep to adipose tissue with a #15 scalpel blade.  The skin margins were undermined to an appropriate distance in all directions around the primary defect and laterally outward around the island pedicle utilizing iris scissors.  There was minimal undermining beneath the pedicle flap.
Alar Island Pedicle Flap Text: Due to geometric and functional constraints, a flap reconstruction was performed to reconstruct the defect.  To that end, adjacent tissue was incised and carried over to close the defect in the following manner: The defect edges were debeveled with a #15 scalpel blade.  Given the location of the defect, shape of the defect and the proximity to the alar rim an island pedicle advancement flap was deemed most appropriate.  Using a sterile surgical marker, an appropriate advancement flap was drawn incorporating the defect, outlining the appropriate donor tissue and placing the expected incisions within the nasal ala running parallel to the alar rim. The area thus outlined was incised with a #15 scalpel blade.  The skin margins were undermined minimally to an appropriate distance in all directions around the primary defect and laterally outward around the island pedicle utilizing iris scissors.  There was minimal undermining beneath the pedicle flap.
Follicular Atypia Histology Text: Follicular atypia was noted and reviewed with patient, patient was advised to monitor and report any skin changes.
Is There Documentation In The Chart Showing Discussion Of Changes With Another Physician?: Please Select the Appropriate Response
Closure 3 Information: This tab is for additional flaps and grafts above and beyond our usual structured repairs.  Please note if you enter information here it will not currently bill and you will need to add the billing information manually.
A-T Advancement Flap Text: Due to geometric and functional constraints, a flap reconstruction was performed to reconstruct the defect.  To that end, adjacent tissue was incised and carried over to close the defect in the following manner: The defect edges were debeveled with a #15 scalpel blade.  Given the location of the defect, shape of the defect and the proximity to free margins an A-T advancement flap was deemed most appropriate.  Using a sterile surgical marker, an appropriate advancement flap was drawn incorporating the defect and placing the expected incisions within the relaxed skin tension lines where possible.    The area thus outlined was incised deep to adipose tissue with a #15 scalpel blade.  The skin margins were undermined to an appropriate distance in all directions utilizing iris scissors.
Anesthesia Volume In Cc: 2
Repair Type: Intermediate Layered Repair
Double M-Plasty Intermediate Repair Preamble Text (Leave Blank If You Do Not Want): Undermining was performed with blunt dissection.
Staged Advancement Flap Text: Due to geometric and functional constraints, a flap reconstruction was performed to reconstruct the defect.  To that end, adjacent tissue was incised and carried over to close the defect in the following manner: The defect edges were debeveled with a #15 scalpel blade.  Given the location of the defect, shape of the defect and the proximity to free margins a staged advancement flap was deemed most appropriate.  Using a sterile surgical marker, an appropriate advancement flap was drawn incorporating the defect and placing the expected incisions within the relaxed skin tension lines where possible. The area thus outlined was incised deep to adipose tissue with a #15 scalpel blade.  The skin margins were undermined to an appropriate distance in all directions utilizing iris scissors.
Alternatives Discussed Intro (Do Not Add Period): I discussed alternative treatments to Mohs surgery and specifically discussed the risks and benefits of
Mastoid Interpolation Flap Text: Due to geometric and functional constraints, a flap reconstruction was performed to reconstruct the defect.  To that end, adjacent tissue was incised and carried over to close the defect in the following manner: A decision was made to reconstruct the defect utilizing an interpolation axial flap and a staged reconstruction.  A telfa template was made of the defect.  This telfa template was then used to outline the mastoid interpolation flap.  The donor area for the pedicle flap was then injected with anesthesia.  The flap was excised through the skin and subcutaneous tissue down to the layer of the underlying musculature.  The pedicle flap was carefully excised within this deep plane to maintain its blood supply.  The edges of the donor site were undermined.   The donor site was closed in a primary fashion.  The pedicle was then rotated into position and sutured.  Once the tube was sutured into place, adequate blood supply was confirmed with blanching and refill.  The pedicle was then wrapped with xeroform gauze and dressed appropriately with a telfa and gauze bandage to ensure continued blood supply and protect the attached pedicle.
Eye Protection Verbiage: Before proceeding with the stage, a plastic scleral shield was inserted. The globe was anesthetized with a few drops of 1% lidocaine with 1:100,000 epinephrine. Then, an appropriate sized scleral shield was chosen and coated with lacrilube ointment. The shield was gently inserted and left in place for the duration of each stage. After the stage was completed, the shield was gently removed.
Referred To Oculoplastics For Closure Text (Leave Blank If You Do Not Want): After obtaining clear surgical margins the patient was sent to oculoplastics for surgical repair.  The patient understands they will receive post-surgical care and follow-up from the referring physician's office.
Manual Repair Warning Statement: We plan on removing the manually selected variable below in favor of our much easier automatic structured text blocks found in the previous tab. We decided to do this to help make the flow better and give you the full power of structured data. Manual selection is never going to be ideal in our platform and I would encourage you to avoid using manual selection from this point on, especially since I will be sunsetting this feature. It is important that you do one of two things with the customized text below. First, you can save all of the text in a word file so you can have it for future reference. Second, transfer the text to the appropriate area in the Library tab. Lastly, if there is a flap or graft type which we do not have you need to let us know right away so I can add it in before the variable is hidden. No need to panic, we plan to give you roughly 6 months to make the change.
Which Eyelid Repair Cpt Are You Using?: 82940
Consent (Lip)/Introductory Paragraph: The rationale for Mohs was explained to the patient and consent was obtained. The risks, benefits and alternatives to therapy were discussed in detail. Specifically, the risks of lip deformity, changes in the oral aperture, infection, scarring, bleeding, prolonged wound healing, incomplete removal, allergy to anesthesia, nerve injury and recurrence were addressed. Prior to the procedure, the treatment site was clearly identified and confirmed by the patient. All components of Universal Protocol/PAUSE Rule completed.
Anesthesia Type: 1% lidocaine with 1:100,000 epinephrine
Island Pedicle Flap-Requiring Vessel Identification Text: Due to geometric and functional constraints, a flap reconstruction was performed to reconstruct the defect.  To that end, adjacent tissue was incised and carried over to close the defect in the following manner: The defect edges were debeveled with a #15 scalpel blade.  Given the location of the defect, shape of the defect and the proximity to free margins an island pedicle advancement flap was deemed most appropriate.  Using a sterile surgical marker, an appropriate advancement flap was drawn, based on the axial vessel mentioned above, incorporating the defect, outlining the appropriate donor tissue and placing the expected incisions within the relaxed skin tension lines where possible.    The area thus outlined was incised deep to adipose tissue with a #15 scalpel blade.  The skin margins were undermined to an appropriate distance in all directions around the primary defect and laterally outward around the island pedicle utilizing iris scissors.  There was minimal undermining beneath the pedicle flap.
Consent 2/Introductory Paragraph: Mohs surgery was explained to the patient and consent was obtained. The risks, benefits and alternatives to therapy were discussed in detail. Specifically, the risks of infection, scarring, bleeding, prolonged wound healing, incomplete removal, allergy to anesthesia, nerve injury and recurrence were addressed. Prior to the procedure, the treatment site was clearly identified and confirmed by the patient. All components of Universal Protocol/PAUSE Rule completed.
Estimated Blood Loss (Cc): minimal
Burow's Graft Text: The defect edges were debeveled with a #15 scalpel blade.  Given the location of the defect, shape of the defect, the proximity to free margins and the presence of a standing cone deformity a Burow's skin graft was deemed most appropriate. The standing cone was removed and this tissue was then trimmed to the shape of the primary defect. The adipose tissue was also removed until only dermis and epidermis were left.  The skin margins of the secondary defect were undermined to an appropriate distance in all directions utilizing iris scissors.  The secondary defect was closed with interrupted buried subcutaneous sutures.  The skin edges were then re-apposed with running  sutures.  The skin graft was then placed in the primary defect and oriented appropriately.
Consent (Ear)/Introductory Paragraph: The rationale for Mohs was explained to the patient and consent was obtained. The risks, benefits and alternatives to therapy were discussed in detail. Specifically, the risks of ear deformity, infection, scarring, bleeding, prolonged wound healing, incomplete removal, allergy to anesthesia, nerve injury and recurrence were addressed. Prior to the procedure, the treatment site was clearly identified and confirmed by the patient. All components of Universal Protocol/PAUSE Rule completed.
Detail Level: Detailed
Suturegard Retention Suture: 2-0 Nylon
Tumor Debulked?: not debulked
Scc In Situ With Follicular Extension Histology Text: There was full thickness epidermal squamous cell atypia and proliferation in a SCCIS pattern with overlying hypogranulosis, parakeratosis, and follicular or adnexal extension.
Tarsorrhaphy Text: A tarsorrhaphy was performed using Frost sutures.
Referring Physician (Optional): ALBARO gracia NP
Pain Refusal Text: I offered to prescribe pain medication but the patient refused to take this medication.
Cheiloplasty (Complex) Text: A decision was made to reconstruct the defect with a  cheiloplasty.  The defect was undermined extensively.  Additional orbicularis oris muscle was excised with a 15 blade scalpel.  The defect was converted into a full thickness wedge to facilite a better cosmetic result.  Small vessels were then tied off with 5-0 monocyrl. The orbicularis oris, superficial fascia, adipose and dermis were then reapproximated.  After the deeper layers were approximated the epidermis was reapproximated with particular care given to realign the vermilion border.
Localized Dermabrasion With Wire Brush Text: The patient was draped in routine manner.  Localized dermabrasion using sterilized sandpaper was performed in routine manner to papillary dermis. This spot dermabrasion is being performed to complete skin cancer reconstruction. It also will eliminate the other sun damaged precancerous cells that are known to be part of the regional effect of a lifetime's worth of sun exposure. This localized dermabrasion is therapeutic and should not be considered cosmetic in any regard.
Ear Wedge Repair Text: A wedge excision was completed by carrying down an excision through the full thickness of the ear and cartilage with an inward facing Burow's triangle. The wound was then closed in a layered fashion.
Inflammation Suggestive Of Cancer Camouflage Histology Text: There was a dense lymphocytic infiltrate which prevented adequate histologic evaluation of adjacent structures and camouflaged potential tumor present.
Bcc  Nodulocystic Histology Text: There were aggregates of basaloid cells.
Unna Boot Text: An Unna boot was placed to help immobilize the limb and facilitate more rapid healing.  Patient was provided instructions on removing the Unna boot at home in 1 week
Epidermal Sutures: 4-0 Ethilon
Suturegard Body: The suture ends were repeatedly re-tightened and re-clamped to achieve the desired tissue expansion.
Graft Cartilage Fenestration Text: The cartilage was fenestrated with a 2mm punch biopsy to help facilitate graft survival and healing.
Abbe Flap (Upper To Lower Lip) Text: The defect of the lower lip was assessed and measured.  Given the location and size of the defect, an Abbe flap was deemed most appropriate.  Using a sterile surgical marker, an appropriate Abbe flap was measured and drawn on the upper lip. Local anesthesia was then infiltrated.  A scalpel was then used to incise the upper lip through and through the skin, vermilion, muscle and mucosa, leaving the flap pedicled on the opposite side.  The flap was then rotated and transferred to the lower lip defect.  The flap was then sutured into place with a three layer technique, closing the orbicularis oris muscle layer with subcutaneous buried sutures, followed by a mucosal layer and an epidermal layer.
Bcc Macronodular Histology Text: There were large aggregates of basaloid cells.
Mohs Method Verbiage: An incision at a 45 degree angle following the standard Mohs approach was done and the specimen was harvested as a microscopic controlled layer.
Date Of Previous Biopsy (Optional): 3/18/24
Consent (Temporal Branch)/Introductory Paragraph: The rationale for Mohs was explained to the patient and consent was obtained. The risks, benefits and alternatives to therapy were discussed in detail. Specifically, the risks of damage to the temporal branch of the facial nerve, infection, scarring, bleeding, prolonged wound healing, incomplete removal, allergy to anesthesia, and recurrence were addressed. Prior to the procedure, the treatment site was clearly identified and confirmed by the patient. All components of Universal Protocol/PAUSE Rule completed.
O-Z Plasty Text: Due to geometric and functional constraints, a flap reconstruction was performed to reconstruct the defect.  To that end, adjacent tissue was incised and carried over to close the defect in the following manner: The defect edges were debeveled with a #15 scalpel blade.  Given the location of the defect, shape of the defect and the proximity to free margins an O-Z plasty (double transposition flap) was deemed most appropriate.  Using a sterile surgical marker, the appropriate transposition flaps were drawn incorporating the defect and placing the expected incisions within the relaxed skin tension lines where possible.    The area thus outlined was incised deep to adipose tissue with a #15 scalpel blade.  The skin margins were undermined to an appropriate distance in all directions utilizing iris scissors.  Hemostasis was achieved with electrocautery.  The flaps were then transposed into place, one clockwise and the other counterclockwise, and anchored with interrupted buried subcutaneous sutures.
Purse String (Simple) Text: Given the location of the defect and the characteristics of the surrounding skin a purse string closure was deemed most appropriate.  Undermining was performed circumferentially around the surgical defect.  A purse string suture was then placed and tightened.
Estlander Flap (Lower To Upper Lip) Text: The defect of the lower lip was assessed and measured.  Given the location and size of the defect, an Estlander flap was deemed most appropriate.  Using a sterile surgical marker, an appropriate Estlander flap was measured and drawn on the upper lip. Local anesthesia was then infiltrated. A scalpel was then used to incise the lateral aspect of the flap, through skin, muscle and mucosa, leaving the flap pedicled medially.  The flap was then rotated and positioned to fill the lower lip defect.  The flap was then sutured into place with a three layer technique, closing the orbicularis oris muscle layer with subcutaneous buried sutures, followed by a mucosal layer and an epidermal layer.
Scc Sarcomatoid Histology Text: There were aggregates of squamous cells in a sarcomatous pattern.
Bcc Infiltrative Histology Text: There were numerous aggregates of basaloid cells demonstrating an infiltrative pattern.
Medical Necessity Statement: Based on my medical judgement, Mohs surgery is the most appropriate treatment for this cancer compared to other treatments.
Previous Accession (Optional): [id number]
Mart-1 - Negative Histology Text: MART-1 staining demonstrates a normal density and pattern of melanocytes along the dermal-epidermal junction. The surgical margins are negative for tumor cells.
Bilateral Rotation Flap Text: The defect edges were debeveled with a #15 scalpel blade. Given the location of the defect, shape of the defect and the proximity to free margins a bilateral rotation flap was deemed most appropriate. Using a sterile surgical marker, an appropriate rotation flap was drawn incorporating the defect and placing the expected incisions within the relaxed skin tension lines where possible. The area thus outlined was incised deep to adipose tissue with a #15 scalpel blade. The skin margins were undermined to an appropriate distance in all directions utilizing iris scissors. Following this, the designed flap was carried over into the primary defect and sutured into place.
Non-Graft Cartilage Fenestration Text: The cartilage was fenestrated with a 2mm punch biopsy to help facilitate healing.
Advancement Flap (Single) Text: Due to geometric and functional constraints, a flap reconstruction was performed to reconstruct the defect.  To that end, adjacent tissue was incised and carried over to close the defect in the following manner: The defect edges were debeveled with a #15 scalpel blade.  Given the location of the defect and the proximity to free margins a single advancement flap was deemed most appropriate.  Using a sterile surgical marker, an appropriate advancement flap was drawn incorporating the defect and placing the expected incisions within the relaxed skin tension lines where possible.    The area thus outlined was incised deep to adipose tissue with a #15 scalpel blade.  The skin margins were undermined to an appropriate distance in all directions utilizing iris scissors.
Mixed Nodular And Infiltrative Bcc Histology Text: There were aggregates of basaloid cells in a nodular and infiltrative pattern.
Cartilage Graft Text: The defect edges were debeveled with a #15 scalpel blade.  Given the location of the defect, shape of the defect, the fact the defect involved a full thickness cartilage defect a cartilage graft was deemed most appropriate.  An appropriate donor site was identified, cleansed, and anesthetized. The cartilage graft was then harvested and transferred to the recipient site, oriented appropriately and then sutured into place.  The secondary defect was then repaired using a primary closure.
Eyelid Full Thickness Repair - [id number]: The eyelid defect was full thickness which required a wedge repair of the eyelid. Special care was taken to ensure that the eyelid margin was realligned when placing sutures.
Bilobed Flap Text: Due to geometric and functional constraints, a flap reconstruction was performed to reconstruct the defect.  To that end, adjacent tissue was incised and carried over to close the defect in the following manner: The defect edges were debeveled with a #15 scalpel blade.  Given the location of the defect and the proximity to free margins a bilobe flap was deemed most appropriate.  Using a sterile surgical marker, an appropriate bilobe flap drawn around the defect.    The area thus outlined was incised deep to adipose tissue with a #15 scalpel blade.  The skin margins were undermined to an appropriate distance in all directions utilizing iris scissors.
Nostril Rim Text: The closure involved the nostril rim.
Mauc Instructions: By selecting yes to the question below the MAUC number will be added into the note.  This will be calculated automatically based on the diagnosis chosen, the size entered, the body zone selected (H,M,L) and the specific indications you chose. You will also have the option to override the Mohs AUC if you disagree with the automatically calculated number and this option is found in the Case Summary tab.
Double O-Z Flap Text: Due to geometric and functional constraints, a flap reconstruction was performed to reconstruct the defect.  To that end, adjacent tissue was incised and carried over to close the defect in the following manner: The defect edges were debeveled with a #15 scalpel blade.  Given the location of the defect, shape of the defect and the proximity to free margins a Double O-Z flap was deemed most appropriate.  Using a sterile surgical marker, an appropriate transposition flap was drawn incorporating the defect and placing the expected incisions within the relaxed skin tension lines where possible. The area thus outlined was incised deep to adipose tissue with a #15 scalpel blade.  The skin margins were undermined to an appropriate distance in all directions utilizing iris scissors.
Skin Substitute Text: The defect edges were debeveled with a #15 scalpel blade.  Given the location of the defect, shape of the defect and the proximity to free margins a skin substitute graft was deemed most appropriate.  The graft material was trimmed to fit the size of the defect. The graft was then placed in the primary defect and oriented appropriately.
Advancement Flap (Double) Text: Due to geometric and functional constraints, a flap reconstruction was performed to reconstruct the defect.  To that end, adjacent tissue was incised and carried over to close the defect in the following manner: The defect edges were debeveled with a #15 scalpel blade.  Given the location of the defect and the proximity to free margins a double advancement flap was deemed most appropriate.  Using a sterile surgical marker, the appropriate advancement flaps were drawn incorporating the defect and placing the expected incisions within the relaxed skin tension lines where possible.    The area thus outlined was incised deep to adipose tissue with a #15 scalpel blade.  The skin margins were undermined to an appropriate distance in all directions utilizing iris scissors.
Bcc Keratotic Histology Text: There were aggregates of basaloid cells demonstrating a pink keratotic pattern.
Dermal Autograft Text: The defect edges were debeveled with a #15 scalpel blade.  Given the location of the defect, shape of the defect and the proximity to free margins a dermal autograft was deemed most appropriate.  Using a sterile surgical marker, the primary defect shape was transferred to the donor site. The area thus outlined was incised deep to adipose tissue with a #15 scalpel blade.  The harvested graft was then trimmed of adipose and epidermal tissue until only dermis was left.  The skin graft was then placed in the primary defect and oriented appropriately.
Epidermal Closure Graft Donor Site (Optional): simple interrupted
Repair Anesthesia Method: local infiltration
Bcc Superficial Pigmented Histology Text: There were aggregates of basaloid cells with pigment budding from the epidermis.
Mixed Superficial And Nodular Bcc Histology Text: There were aggregates of basaloid cells in a superficial and nodular pattern.
Scc In Situ Histology Text: There was full thickness epidermal squamous cell atypia and proliferation in a SCCIS pattern with overlying hypogranulosis and parakeratosis.
Graft Donor Site Bandage (Optional-Leave Blank If You Don't Want In Note): Pressure bandage were applied to the donor site.
Retention Suture Bite Size: 3 mm
Tumor Depth: Less than 6mm from granular layer and no invasion beyond the subcutaneous fat
Consent Type: Consent 1 (Standard)
Wound Care: Petrolatum
Intermediate Repair And Flap Additional Text (Will Appearing After The Standard Complex Repair Text): The intermediate repair was not sufficient to completely close the primary defect. The remaining additional defect was repaired with the flap mentioned below.
Surgeon/Pathologist Verbiage (Will Incorporate Name Of Surgeon From Intro If Not Blank): operated in two distinct and integrated capacities as the surgeon and pathologist.
Island Pedicle Flap Text: Due to geometric and functional constraints, a flap reconstruction was performed to reconstruct the defect.  To that end, adjacent tissue was incised and carried over to close the defect in the following manner: The defect edges were debeveled with a #15 scalpel blade.  Given the location of the defect, shape of the defect and the proximity to free margins an island pedicle advancement flap was deemed most appropriate.  Using a sterile surgical marker, an appropriate advancement flap was drawn incorporating the defect, outlining the appropriate donor tissue and placing the expected incisions within the relaxed skin tension lines where possible.    The area thus outlined was incised deep to adipose tissue with a #15 scalpel blade.  The skin margins were undermined to an appropriate distance in all directions around the primary defect and laterally outward around the island pedicle utilizing iris scissors.  There was minimal undermining beneath the pedicle flap.
Crescentic Advancement Flap Text: Due to geometric and functional constraints, a flap reconstruction was performed to reconstruct the defect.  To that end, adjacent tissue was incised and carried over to close the defect in the following manner: The defect edges were debeveled with a #15 scalpel blade.  Given the location of the defect and the proximity to free margins a crescentic advancement flap was deemed most appropriate.  Using a sterile surgical marker, the appropriate advancement flap was drawn incorporating the defect and placing the expected incisions within the relaxed skin tension lines where possible.    The area thus outlined was incised deep to adipose tissue with a #15 scalpel blade.  The skin margins were undermined to an appropriate distance in all directions utilizing iris scissors.
Hatchet Flap Text: Due to geometric and functional constraints, a flap reconstruction was performed to reconstruct the defect.  To that end, adjacent tissue was incised and carried over to close the defect in the following manner: The defect edges were debeveled with a #15 scalpel blade.  Given the location of the defect, shape of the defect and the proximity to free margins a hatchet flap was deemed most appropriate.  Using a sterile surgical marker, an appropriate hatchet flap was drawn incorporating the defect and placing the expected incisions within the relaxed skin tension lines where possible.    The area thus outlined was incised deep to adipose tissue with a #15 scalpel blade.  The skin margins were undermined to an appropriate distance in all directions utilizing iris scissors.
Melolabial Interpolation Flap Text: Due to geometric and functional constraints, a flap reconstruction was performed to reconstruct the defect.  To that end, adjacent tissue was incised and carried over to close the defect in the following manner: A decision was made to reconstruct the defect utilizing an interpolation axial flap and a staged reconstruction.  A telfa template was made of the defect.  This telfa template was then used to outline the melolabial interpolation flap.  The donor area for the pedicle flap was then injected with anesthesia.  The flap was excised through the skin and subcutaneous tissue down to the layer of the underlying musculature.  The pedicle flap was carefully excised within this deep plane to maintain its blood supply.  The edges of the donor site were undermined.   The donor site was closed in a primary fashion.  The pedicle was then rotated into position and sutured.  Once the tube was sutured into place, adequate blood supply was confirmed with blanching and refill.  The pedicle was then wrapped with xeroform gauze and dressed appropriately with a telfa and gauze bandage to ensure continued blood supply and protect the attached pedicle.
Area H Indication Text: Tumors in this location are included in Area H (eyelids, eyebrows, nose, lips, chin, ear, pre-auricular, post-auricular, temple, genitalia, hands, feet, ankles and areola).  Tissue conservation is critical in these anatomic locations.
Partial Purse String (Intermediate) Text: Given the location of the defect and the characteristics of the surrounding skin an intermediate purse string closure was deemed most appropriate.  Undermining was performed circumferentially around the surgical defect.  A purse string suture was then placed and tightened. Wound tension only allowed a partial closure of the circular defect.
Staging Info: By selecting yes to the question above you will include information on AJCC 8 tumor staging in your Mohs note. Information on tumor staging will be automatically added for SCCs on the head and neck. AJCC 8 includes tumor size, tumor depth, perineural involvement and bone invasion.
Muscle Hinge Flap Text: Due to geometric and functional constraints, a flap reconstruction was performed to reconstruct the defect.  To that end, adjacent tissue was incised and carried over to close the defect in the following manner: The defect edges were debeveled with a #15 scalpel blade.  Given the size, depth and location of the defect and the proximity to free margins a muscle hinge flap was deemed most appropriate.  Using a sterile surgical marker, an appropriate hinge flap was drawn incorporating the defect. The area thus outlined was incised with a #15 scalpel blade.  The skin margins were undermined to an appropriate distance in all directions utilizing iris scissors.
Bilateral Helical Rim Advancement Flap Text: Due to geometric and functional constraints, a flap reconstruction was performed to reconstruct the defect.  To that end, adjacent tissue was incised and carried over to close the defect in the following manner: The defect edges were debeveled with a #15 blade scalpel.  Given the location of the defect and the proximity to free margins (helical rim) a bilateral helical rim advancement flap was deemed most appropriate.  Using a sterile surgical marker, the appropriate advancement flaps were drawn incorporating the defect and placing the expected incisions between the helical rim and antihelix where possible.  The area thus outlined was incised through and through with a #15 scalpel blade.  With a skin hook and iris scissors, the flaps were gently and sharply undermined and freed up.
Partial Purse String (Simple) Text: Given the location of the defect and the characteristics of the surrounding skin a simple purse string closure was deemed most appropriate.  Undermining was performed circumferentially around the surgical defect.  A purse string suture was then placed and tightened. Wound tension only allowed a partial closure of the circular defect.
Burow's Advancement Flap Text: Due to geometric and functional constraints, a flap reconstruction was performed to reconstruct the defect.  To that end, adjacent tissue was incised and carried over to close the defect in the following manner: The defect edges were debeveled with a #15 scalpel blade.  Given the location of the defect and the proximity to free margins a Burow's advancement flap was deemed most appropriate.  Using a sterile surgical marker, the appropriate advancement flap was drawn incorporating the defect and placing the expected incisions within the relaxed skin tension lines where possible.    The area thus outlined was incised deep to adipose tissue with a #15 scalpel blade.  The skin margins were undermined to an appropriate distance in all directions utilizing iris scissors.
Cheiloplasty (Less Than 50%) Text: A decision was made to reconstruct the defect with a  cheiloplasty.  The defect was undermined extensively.  Additional orbicularis oris muscle was excised with a 15 blade scalpel.  The defect was converted into a full thickness wedge, of less than 50% of the vertical height of the lip, to facilite a better cosmetic result.  Small vessels were then tied off with 5-0 monocyrl. The orbicularis oris, superficial fascia, adipose and dermis were then reapproximated.  After the deeper layers were approximated the epidermis was reapproximated with particular care given to realign the vermilion border.
Split-Thickness Skin Graft Text: The defect edges were debeveled with a #15 scalpel blade.  Given the location of the defect, shape of the defect and the proximity to free margins a split thickness skin graft was deemed most appropriate.  Using a sterile surgical marker, the primary defect shape was transferred to the donor site. The split thickness graft was then harvested.  The skin graft was then placed in the primary defect and oriented appropriately.
Consent (Nose)/Introductory Paragraph: The rationale for Mohs was explained to the patient and consent was obtained. The risks, benefits and alternatives to therapy were discussed in detail. Specifically, the risks of nasal deformity, changes in the flow of air through the nose, infection, scarring, bleeding, prolonged wound healing, incomplete removal, allergy to anesthesia, nerve injury and recurrence were addressed. Prior to the procedure, the treatment site was clearly identified and confirmed by the patient. All components of Universal Protocol/PAUSE Rule completed.
Complex Repair And Flap Additional Text (Will Appearing After The Standard Complex Repair Text): The complex repair was not sufficient to completely close the primary defect. The remaining additional defect was repaired with the flap mentioned below.
O-Z Flap Text: Due to geometric and functional constraints, a flap reconstruction was performed to reconstruct the defect.  To that end, adjacent tissue was incised and carried over to close the defect in the following manner: The defect edges were debeveled with a #15 scalpel blade.  Given the location of the defect, shape of the defect and the proximity to free margins an O-Z flap was deemed most appropriate.  Using a sterile surgical marker, an appropriate transposition flap was drawn incorporating the defect and placing the expected incisions within the relaxed skin tension lines where possible. The area thus outlined was incised deep to adipose tissue with a #15 scalpel blade.  The skin margins were undermined to an appropriate distance in all directions utilizing iris scissors.
Area Suspicious For Tumor Histology Text: An area suspicious for additional tumor was noted and excised with additional stage(s).
Abbe Flap (Lower To Upper Lip) Text: The defect of the upper lip was assessed and measured.  Given the location and size of the defect, an Abbe flap was deemed most appropriate.  Using a sterile surgical marker, an appropriate Abbe flap was measured and drawn on the lower lip. Local anesthesia was then infiltrated. A scalpel was then used to incise the upper lip through and through the skin, vermilion, muscle and mucosa, leaving the flap pedicled on the opposite side.  The flap was then rotated and transferred to the lower lip defect.  The flap was then sutured into place with a three layer technique, closing the orbicularis oris muscle layer with subcutaneous buried sutures, followed by a mucosal layer and an epidermal layer.
Bi-Rhombic Flap Text: Due to geometric and functional constraints, a flap reconstruction was performed to reconstruct the defect.  To that end, adjacent tissue was incised and carried over to close the defect in the following manner: The defect edges were debeveled with a #15 scalpel blade.  Given the location of the defect and the proximity to free margins a bi-rhombic flap was deemed most appropriate.  Using a sterile surgical marker, an appropriate rhombic flap was drawn incorporating the defect. The area thus outlined was incised deep to adipose tissue with a #15 scalpel blade.  The skin margins were undermined to an appropriate distance in all directions utilizing iris scissors.
Suturegard Intro: Intraoperative tissue expansion was performed, utilizing the SUTUREGARD device, in order to reduce wound tension.
Simple / Intermediate / Complex Repair - Final Wound Length In Cm: 3.6
Ear Star Wedge Flap Text: Due to geometric and functional constraints, a flap reconstruction was performed to reconstruct the defect.  To that end, adjacent tissue was incised and carried over to close the defect in the following manner: The defect edges were debeveled with a #15 blade scalpel.  Given the location of the defect and the proximity to free margins (helical rim) an ear star wedge flap was deemed most appropriate.  Using a sterile surgical marker, the appropriate flap was drawn incorporating the defect and placing the expected incisions between the helical rim and antihelix where possible.  The area thus outlined was incised through and through with a #15 scalpel blade.
Length To Time In Minutes Device Was In Place: 10
Chonodrocutaneous Helical Advancement Flap Text: Due to geometric and functional constraints, a flap reconstruction was performed to reconstruct the defect.  To that end, adjacent tissue was incised and carried over to close the defect in the following manner: The defect edges were debeveled with a #15 scalpel blade.  Given the location of the defect and the proximity to free margins a chondrocutaneous helical advancement flap was deemed most appropriate.  Using a sterile surgical marker, the appropriate advancement flap was drawn incorporating the defect and placing the expected incisions within the relaxed skin tension lines where possible.    The area thus outlined was incised deep to adipose tissue with a #15 scalpel blade.  The skin margins were undermined to an appropriate distance in all directions utilizing iris scissors.
Initial Size Of Lesion: 2.3
Keystone Flap Text: Due to geometric and functional constraints, a flap reconstruction was performed to reconstruct the defect.  To that end, adjacent tissue was incised and carried over to close the defect in the following manner: The defect edges were debeveled with a #15 scalpel blade.  Given the location of the defect, shape of the defect a keystone flap was deemed most appropriate.  Using a sterile surgical marker, an appropriate keystone flap was drawn incorporating the defect, outlining the appropriate donor tissue and placing the expected incisions within the relaxed skin tension lines where possible. The area thus outlined was incised deep to adipose tissue with a #15 scalpel blade.  The skin margins were undermined to an appropriate distance in all directions around the primary defect and laterally outward around the flap utilizing iris scissors.
Trilobed Flap Text: Due to geometric and functional constraints, a flap reconstruction was performed to reconstruct the defect.  To that end, adjacent tissue was incised and carried over to close the defect in the following manner: The defect edges were debeveled with a #15 scalpel blade.  Given the location of the defect and the proximity to free margins a trilobed flap was deemed most appropriate.  Using a sterile surgical marker, an appropriate trilobed flap drawn around the defect.    The area thus outlined was incised deep to adipose tissue with a #15 scalpel blade.  The skin margins were undermined to an appropriate distance in all directions utilizing iris scissors.
Consent (Near Eyelid Margin)/Introductory Paragraph: The rationale for Mohs was explained to the patient and consent was obtained. The risks, benefits and alternatives to therapy were discussed in detail. Specifically, the risks of ectropion or eyelid deformity, infection, scarring, bleeding, prolonged wound healing, incomplete removal, allergy to anesthesia, nerve injury and recurrence were addressed. Prior to the procedure, the treatment site was clearly identified and confirmed by the patient. All components of Universal Protocol/PAUSE Rule completed.
Zygomaticofacial Flap Text: Given the location of the defect, shape of the defect and the proximity to free margins a zygomaticofacial flap was deemed most appropriate for repair.  Using a sterile surgical marker, the appropriate flap was drawn incorporating the defect and placing the expected incisions within the relaxed skin tension lines where possible. The area thus outlined was incised deep to adipose tissue with a #15 scalpel blade with preservation of a vascular pedicle.  The skin margins were undermined to an appropriate distance in all directions utilizing iris scissors.  The flap was then placed into the defect and anchored with interrupted buried subcutaneous sutures.
Wound Care (No Sutures): Mastisol
Incidental Superficial Basal Cell Carcinoma Histology Text: Incidental superficial BCC was noted at the periphery of the Mohs section.  If clear margins were not obtained, patient was advised to considered 35%TCA followed by topical 5FU once fully healed.
Bcc Infundibulocystic Histology Text: There were aggregates of basaloid cells demonstrating an infundibulocystic pattern.
Scc Acantholytic Histology Text: There were aggregates of squamous cells in an acantholytic pattern.
Mustarde Flap Text: Due to geometric and functional constraints, a flap reconstruction was performed to reconstruct the defect.  To that end, adjacent tissue was incised and carried over to close the defect in the following manner: The defect edges were debeveled with a #15 scalpel blade.  Given the size, depth and location of the defect and the proximity to free margins a Mustarde flap was deemed most appropriate.  Using a sterile surgical marker, an appropriate flap was drawn incorporating the defect. The area thus outlined was incised with a #15 scalpel blade.  The skin margins were undermined to an appropriate distance in all directions utilizing iris scissors.
Bcc Pigmented Histology Text: There were aggregates of basaloid cells with pigment.
Complex Repair And Graft Additional Text (Will Appearing After The Standard Complex Repair Text): The complex repair was not sufficient to completely close the primary defect. The remaining additional defect was repaired with the graft mentioned below.
Z Plasty Text: The lesion was extirpated to the level of the fat with a #15 scalpel blade.  Given the location of the defect, shape of the defect and the proximity to free margins a Z-plasty was deemed most appropriate for repair.  Using a sterile surgical marker, the appropriate transposition arms of the Z-plasty were drawn incorporating the defect and placing the expected incisions within the relaxed skin tension lines where possible.    The area thus outlined was incised deep to adipose tissue with a #15 scalpel blade.  The skin margins were undermined to an appropriate distance in all directions utilizing iris scissors.  The opposing transposition arms were then transposed into place in opposite direction and anchored with interrupted buried subcutaneous sutures.
Rhombic Flap Text: Due to geometric and functional constraints, a flap reconstruction was performed to reconstruct the defect.  To that end, adjacent tissue was incised and carried over to close the defect in the following manner: The defect edges were debeveled with a #15 scalpel blade.  Given the location of the defect and the proximity to free margins a rhombic flap was deemed most appropriate.  Using a sterile surgical marker, an appropriate rhombic flap was drawn incorporating the defect.    The area thus outlined was incised deep to adipose tissue with a #15 scalpel blade.  The skin margins were undermined to an appropriate distance in all directions utilizing iris scissors.
Rhomboid Transposition Flap Text: Due to geometric and functional constraints, a flap reconstruction was performed to reconstruct the defect.  To that end, adjacent tissue was incised and carried over to close the defect in the following manner: The defect edges were debeveled with a #15 scalpel blade.  Given the location of the defect and the proximity to free margins a rhomboid transposition flap was deemed most appropriate.  Using a sterile surgical marker, an appropriate rhomboid flap was drawn incorporating the defect.    The area thus outlined was incised deep to adipose tissue with a #15 scalpel blade.  The skin margins were undermined to an appropriate distance in all directions utilizing iris scissors.
Depth Of Tumor Invasion (For Histology): tumor not visualized (deep and peripheral margins are clear of tumor)
Ftsg Text: The defect edges were debeveled with a #15 scalpel blade.  Given the location of the defect, shape of the defect and the proximity to free margins a full thickness skin graft was deemed most appropriate.  Using a sterile surgical marker, the primary defect shape was transferred to the donor site. The area thus outlined was incised deep to adipose tissue with a #15 scalpel blade.  The harvested graft was then trimmed of adipose tissue until only dermis and epidermis was left.  The skin margins of the secondary defect were undermined to an appropriate distance in all directions utilizing iris scissors.  The secondary defect was closed with interrupted buried subcutaneous sutures.  The skin edges were then re-apposed with running  sutures.  The skin graft was then placed in the primary defect and oriented appropriately.
Bcc  Morpheaform/Sclerosing Histology Text: There were numerous aggregates of basaloid cells demonstrating an infiltrative pattern in a background of sclerotic stroma
Scc Moderately Differentiated Histology Text: There were aggregates of moderately-differentiated squamous cells.
Dressing: pressure dressing with telfa
Scc Ka Subtype Histology Text: There were aggregates of glassy squamous cells consistent with keratoacanthoma.
Helical Rim Text: The closure involved the helical rim.
Epidermal Autograft Text: The defect edges were debeveled with a #15 scalpel blade.  Given the location of the defect, shape of the defect and the proximity to free margins an epidermal autograft was deemed most appropriate.  Using a sterile surgical marker, the primary defect shape was transferred to the donor site. The epidermal graft was then harvested.  The skin graft was then placed in the primary defect and oriented appropriately.
Purse String (Intermediate) Text: Given the location of the defect and the characteristics of the surrounding skin a purse string intermediate closure was deemed most appropriate.  Undermining was performed circumferentially around the surgical defect.  A purse string suture was then placed and tightened.
Donor Site Anesthesia Type: same as repair anesthesia
H Plasty Text: Given the location of the defect, shape of the defect and the proximity to free margins a H-plasty was deemed most appropriate for repair.  Using a sterile surgical marker, the appropriate advancement arms of the H-plasty were drawn incorporating the defect and placing the expected incisions within the relaxed skin tension lines where possible. The area thus outlined was incised deep to adipose tissue with a #15 scalpel blade. The skin margins were undermined to an appropriate distance in all directions utilizing iris scissors.  The opposing advancement arms were then advanced into place in opposite direction and anchored with interrupted buried subcutaneous sutures.
Modified Advancement Flap Text: Due to geometric and functional constraints, a flap reconstruction was performed to reconstruct the defect.  To that end, adjacent tissue was incised and carried over to close the defect in the following manner: The defect edges were debeveled with a #15 scalpel blade.  Given the location of the defect, shape of the defect and the proximity to free margins a modified advancement flap was deemed most appropriate.  Using a sterile surgical marker, an appropriate advancement flap was drawn incorporating the defect and placing the expected incisions within the relaxed skin tension lines where possible.    The area thus outlined was incised deep to adipose tissue with a #15 scalpel blade.  The skin margins were undermined to an appropriate distance in all directions utilizing iris scissors.
Peng Advancement Flap Text: Due to geometric and functional constraints, a flap reconstruction was performed to reconstruct the defect.  To that end, adjacent tissue was incised and carried over to close the defect in the following manner: The defect edges were debeveled with a #15 scalpel blade.  Given the location of the defect, shape of the defect and the proximity to free margins a Peng advancement flap was deemed most appropriate.  Using a sterile surgical marker, an appropriate advancement flap was drawn incorporating the defect and placing the expected incisions within the relaxed skin tension lines where possible. The area thus outlined was incised deep to adipose tissue with a #15 scalpel blade.  The skin margins were undermined to an appropriate distance in all directions utilizing iris scissors.
Bilobed Transposition Flap Text: Due to geometric and functional constraints, a flap reconstruction was performed to reconstruct the defect.  To that end, adjacent tissue was incised and carried over to close the defect in the following manner: The defect edges were debeveled with a #15 scalpel blade.  Given the location of the defect and the proximity to free margins a bilobed transposition flap was deemed most appropriate.  Using a sterile surgical marker, an appropriate bilobe flap drawn around the defect.    The area thus outlined was incised deep to adipose tissue with a #15 scalpel blade.  The skin margins were undermined to an appropriate distance in all directions utilizing iris scissors.
V-Y Flap Text: Due to geometric and functional constraints, a flap reconstruction was performed to reconstruct the defect.  To that end, adjacent tissue was incised and carried over to close the defect in the following manner: The defect edges were debeveled with a #15 scalpel blade.  Given the location of the defect, shape of the defect and the proximity to free margins a V-Y flap was deemed most appropriate.  Using a sterile surgical marker, an appropriate advancement flap was drawn incorporating the defect and placing the expected incisions within the relaxed skin tension lines where possible.    The area thus outlined was incised deep to adipose tissue with a #15 scalpel blade.  The skin margins were undermined to an appropriate distance in all directions utilizing iris scissors.
Consent 3/Introductory Paragraph: I gave the patient a chance to ask questions they had about the procedure.  Following this I explained the Mohs procedure and consent was obtained. The risks, benefits and alternatives to therapy were discussed in detail. Specifically, the risks of infection, scarring, bleeding, prolonged wound healing, incomplete removal, allergy to anesthesia, nerve injury and recurrence were addressed. Prior to the procedure, the treatment site was clearly identified and confirmed by the patient. All components of Universal Protocol/PAUSE Rule completed.
Postop Diagnosis: same
Advancement-Rotation Flap Text: Due to geometric and functional constraints, a flap reconstruction was performed to reconstruct the defect.  To that end, adjacent tissue was incised and carried over to close the defect in the following manner: The defect edges were debeveled with a #15 scalpel blade.  Given the location of the defect, shape of the defect and the proximity to free margins an advancement-rotation flap was deemed most appropriate.  Using a sterile surgical marker, an appropriate flap was drawn incorporating the defect and placing the expected incisions within the relaxed skin tension lines where possible. The area thus outlined was incised deep to adipose tissue with a #15 scalpel blade.  The skin margins were undermined to an appropriate distance in all directions utilizing iris scissors.
Dorsal Nasal Flap Text: The defect edges were debeveled with a #15 scalpel blade.  Given the Due to geometric and functional constraints, a flap reconstruction was performed to reconstruct the defect.  To that end, adjacent tissue was incised and carried over to close the defect in the following manner: location of the defect and the proximity to free margins a dorsal nasal flap was deemed most appropriate.  Using a sterile surgical marker, an appropriate dorsal nasal flap was drawn around the defect.    The area thus outlined was incised deep to adipose tissue with a #15 scalpel blade.  The skin margins were undermined to an appropriate distance in all directions utilizing iris scissors.
Secondary Intention Text (Leave Blank If You Do Not Want): The defect will heal with secondary intention.
Dressing (No Sutures): steri-strips and pressure dressing
Scc Desmoplastic Subtype Histology Text: There were aggregates of squamous cells in a desmoplastic stroma.
Information: Selecting Yes will display possible errors in your note based on the variables you have selected. This validation is only offered as a suggestion for you. PLEASE NOTE THAT THE VALIDATION TEXT WILL BE REMOVED WHEN YOU FINALIZE YOUR NOTE. IF YOU WANT TO FAX A PRELIMINARY NOTE YOU WILL NEED TO TOGGLE THIS TO 'NO' IF YOU DO NOT WANT IT IN YOUR FAXED NOTE.
Double Z Plasty Text: The lesion was extirpated to the level of the fat with a #15 scalpel blade. Given the location of the defect, shape of the defect and the proximity to free margins a double Z-plasty was deemed most appropriate for repair. Using a sterile surgical marker, the appropriate transposition arms of the double Z-plasty were drawn incorporating the defect and placing the expected incisions within the relaxed skin tension lines where possible. The area thus outlined was incised deep to adipose tissue with a #15 scalpel blade. The skin margins were undermined to an appropriate distance in all directions utilizing iris scissors. The opposing transposition arms were then transposed and carried over into place in opposite direction and anchored with interrupted buried subcutaneous sutures.
Missing Epidermis Histology Text: Missing tissue was noted on frozen sections and additional slides were cut until present.  If no additional tissue containing epidermis was available, then an additional stage was excised.
Cheek-To-Nose Interpolation Flap Text: Due to geometric and functional constraints, a flap reconstruction was performed to reconstruct the defect.  To that end, adjacent tissue was incised and carried over to close the defect in the following manner: A decision was made to reconstruct the defect utilizing an interpolation axial flap and a staged reconstruction.  A telfa template was made of the defect.  This telfa template was then used to outline the Cheek-To-Nose Interpolation flap.  The donor area for the pedicle flap was then injected with anesthesia.  The flap was excised through the skin and subcutaneous tissue down to the layer of the underlying musculature.  The interpolation flap was carefully excised within this deep plane to maintain its blood supply.  The edges of the donor site were undermined.   The donor site was closed in a primary fashion.  The pedicle was then rotated into position and sutured.  Once the tube was sutured into place, adequate blood supply was confirmed with blanching and refill.  The pedicle was then wrapped with xeroform gauze and dressed appropriately with a telfa and gauze bandage to ensure continued blood supply and protect the attached pedicle.
Rotation Flap Text: Due to geometric and functional constraints, a flap reconstruction was performed to reconstruct the defect.  To that end, adjacent tissue was incised and carried over to close the defect in the following manner: The defect edges were debeveled with a #15 scalpel blade.  Given the location of the defect, shape of the defect and the proximity to free margins a rotation flap was deemed most appropriate.  Using a sterile surgical marker, an appropriate rotation flap was drawn incorporating the defect and placing the expected incisions within the relaxed skin tension lines where possible.    The area thus outlined was incised deep to adipose tissue with a #15 scalpel blade.  The skin margins were undermined to an appropriate distance in all directions utilizing iris scissors.
Where Do You Want The Question To Include Opioid Counseling Located?: Case Summary Tab
Deep Sutures: 4-0 Vicryl
Mercedes Flap Text: Due to geometric and functional constraints, a flap reconstruction was performed to reconstruct the defect.  To that end, adjacent tissue was incised and carried over to close the defect in the following manner: The defect edges were debeveled with a #15 scalpel blade.  Given the location of the defect, shape of the defect and the proximity to free margins a Mercedes flap was deemed most appropriate.  Using a sterile surgical marker, an appropriate advancement flap was drawn incorporating the defect and placing the expected incisions within the relaxed skin tension lines where possible. The area thus outlined was incised deep to adipose tissue with a #15 scalpel blade.  The skin margins were undermined to an appropriate distance in all directions utilizing iris scissors.
Consent (Marginal Mandibular)/Introductory Paragraph: The rationale for Mohs was explained to the patient and consent was obtained. The risks, benefits and alternatives to therapy were discussed in detail. Specifically, the risks of damage to the marginal mandibular branch of the facial nerve, infection, scarring, bleeding, prolonged wound healing, incomplete removal, allergy to anesthesia, and recurrence were addressed. Prior to the procedure, the treatment site was clearly identified and confirmed by the patient. All components of Universal Protocol/PAUSE Rule completed.
Vermilion Border Text: The closure involved the vermilion border.
Banner Transposition Flap Text: Due to geometric and functional constraints, a flap reconstruction was performed to reconstruct the defect.  To that end, adjacent tissue was incised and carried over to close the defect in the following manner: The defect edges were debeveled with a #15 scalpel blade.  Given the location of the defect and the proximity to free margins a Banner transposition flap was deemed most appropriate.  Using a sterile surgical marker, an appropriate flap drawn around the defect. The area thus outlined was incised deep to adipose tissue with a #15 scalpel blade.  The skin margins were undermined to an appropriate distance in all directions utilizing iris scissors.
Mohs Rapid Report Verbiage: The area of clinically evident tumor was marked with skin marking ink and appropriately hatched.  The initial incision was made following the Mohs approach through the skin.  The specimen was taken to the lab, divided into the necessary number of pieces, chromacoded and processed according to the Mohs protocol.  This was repeated in successive stages until a tumor free defect was achieved.
Double O-Z Plasty Text: Due to geometric and functional constraints, a flap reconstruction was performed to reconstruct the defect.  To that end, adjacent tissue was incised and carried over to close the defect in the following manner: The defect edges were debeveled with a #15 scalpel blade.  Given the location of the defect, shape of the defect and the proximity to free margins a Double O-Z plasty (double transposition flap) was deemed most appropriate.  Using a sterile surgical marker, the appropriate transposition flaps were drawn incorporating the defect and placing the expected incisions within the relaxed skin tension lines where possible. The area thus outlined was incised deep to adipose tissue with a #15 scalpel blade.  The skin margins were undermined to an appropriate distance in all directions utilizing iris scissors.  Hemostasis was achieved with electrocautery.  The flaps were then transposed into place, one clockwise and the other counterclockwise, and anchored with interrupted buried subcutaneous sutures.
Debridement Text: The wound edges were debrided prior to proceeding with the closure to facilitate wound healing.
Mucosal Advancement Flap Text: Due to geometric and functional constraints, a flap reconstruction was performed to reconstruct the defect.  To that end, adjacent tissue was incised and carried over to close the defect in the following manner: Given the location of the defect, shape of the defect and the proximity to free margins a mucosal advancement flap was deemed most appropriate. Incisions were made with a 15 blade scalpel in the appropriate fashion along the cutaneous vermilion border and the mucosal lip. The remaining actinically damaged mucosal tissue was excised.  The mucosal advancement flap was then elevated to the gingival sulcus with care taken to preserve the neurovascular structures and advanced into the primary defect. Care was taken to ensure that precise realignment of the vermilion border was achieved.
Star Wedge Flap Text: Due to geometric and functional constraints, a flap reconstruction was performed to reconstruct the defect.  To that end, adjacent tissue was incised and carried over to close the defect in the following manner: The defect edges were debeveled with a #15 scalpel blade.  Given the location of the defect, shape of the defect and the proximity to free margins a star wedge flap was deemed most appropriate.  Using a sterile surgical marker, an appropriate rotation flap was drawn incorporating the defect and placing the expected incisions within the relaxed skin tension lines where possible. The area thus outlined was incised deep to adipose tissue with a #15 scalpel blade.  The skin margins were undermined to an appropriate distance in all directions utilizing iris scissors.
W Plasty Text: The lesion was extirpated to the level of the fat with a #15 scalpel blade.  Given the location of the defect, shape of the defect and the proximity to free margins a W-plasty was deemed most appropriate for repair.  Using a sterile surgical marker, the appropriate transposition arms of the W-plasty were drawn incorporating the defect and placing the expected incisions within the relaxed skin tension lines where possible.    The area thus outlined was incised deep to adipose tissue with a #15 scalpel blade.  The skin margins were undermined to an appropriate distance in all directions utilizing iris scissors.  The opposing transposition arms were then transposed into place in opposite direction and anchored with interrupted buried subcutaneous sutures.
Consent 1/Introductory Paragraph: The rationale for Mohs was explained to the patient and consent was obtained. The risks, benefits and alternatives to therapy were discussed in detail. Specifically, the risks of infection, scarring, bleeding, prolonged wound healing, incomplete removal, allergy to anesthesia, nerve injury and recurrence were addressed. Prior to the procedure, the treatment site was clearly identified and confirmed by the patient. All components of Universal Protocol/PAUSE Rule completed.
Full Thickness Lip Wedge Repair (Flap) Text: Given the location of the defect and the proximity to free margins a full thickness wedge repair was deemed most appropriate.  Using a sterile surgical marker, the appropriate repair was drawn incorporating the defect and placing the expected incisions perpendicular to the vermilion border.  The vermilion border was also meticulously outlined to ensure appropriate reapproximation during the repair.  The area thus outlined was incised through and through with a #15 scalpel blade.  The muscularis and dermis were reaproximated with deep sutures following hemostasis. Care was taken to realign the vermilion border before proceeding with the superficial closure.  Once the vermilion was realigned the superfical and mucosal closure was finished.
Subsequent Stages Histo Method Verbiage: Using a similar technique to that described above, a thin layer of tissue was removed from all areas where tumor was visible on the previous stage.  The tissue was again oriented, mapped, dyed, and processed as above.
Bcc Superficial Histology Text: There were aggregates of basaloid cells budding from the epidermis.
Paramedian Forehead Flap Text: Due to geometric and functional constraints, a flap reconstruction was performed to reconstruct the defect.  To that end, adjacent tissue was incised and carried over to close the defect in the following manner: A decision was made to reconstruct the defect utilizing an interpolation axial flap and a staged reconstruction.  A telfa template was made of the defect.  This telfa template was then used to outline the paramedian forehead pedicle flap.  The donor area for the pedicle flap was then injected with anesthesia.  The flap was excised through the skin and subcutaneous tissue down to the layer of the underlying musculature.  The pedicle flap was carefully excised within this deep plane to maintain its blood supply.  The edges of the donor site were undermined.   The donor site was closed in a primary fashion.  The pedicle was then rotated into position and sutured.  Once the tube was sutured into place, adequate blood supply was confirmed with blanching and refill.  The pedicle was then wrapped with xeroform gauze and dressed appropriately with a telfa and gauze bandage to ensure continued blood supply and protect the attached pedicle.
Composite Graft Text: The defect edges were debeveled with a #15 scalpel blade.  Given the location of the defect, shape of the defect, the proximity to free margins and the fact the defect was full thickness a composite graft was deemed most appropriate.  The defect was outline and then transferred to the donor site.  A full thickness graft was then excised from the donor site. The graft was then placed in the primary defect, oriented appropriately and then sutured into place.  The secondary defect was then repaired using a primary closure.
Transposition Flap Text: Due to geometric and functional constraints, a flap reconstruction was performed to reconstruct the defect.  To that end, adjacent tissue was incised and carried over to close the defect in the following manner: The defect edges were debeveled with a #15 scalpel blade.  Given the location of the defect and the proximity to free margins a transposition flap was deemed most appropriate.  Using a sterile surgical marker, an appropriate transposition flap was drawn incorporating the defect.    The area thus outlined was incised deep to adipose tissue with a #15 scalpel blade.  The skin margins were undermined to an appropriate distance in all directions utilizing iris scissors.
Pinch Graft Text: The defect edges were debeveled with a #15 scalpel blade. Given the location of the defect, shape of the defect and the proximity to free margins a pinch graft was deemed most appropriate. Using a sterile surgical marker, the primary defect shape was transferred to the donor site. The area thus outlined was incised deep to adipose tissue with a #15 scalpel blade.  The harvested graft was then trimmed of adipose tissue until only dermis and epidermis was left. The skin margins of the secondary defect were undermined to an appropriate distance in all directions utilizing iris scissors.  The secondary defect was closed with interrupted buried subcutaneous sutures.  The skin edges were then re-apposed with running  sutures.  The skin graft was then placed in the primary defect and oriented appropriately.
Mohs Case Number: [id number]
No Repair - Repaired With Adjacent Surgical Defect Text (Leave Blank If You Do Not Want): After obtaining clear surgical margins the defect was repaired concurrently with another surgical defect which was in close approximation.
Intermediate Repair And Graft Additional Text (Will Appearing After The Standard Complex Repair Text): The intermediate repair was not sufficient to completely close the primary defect. The remaining additional defect was repaired with the graft mentioned below.
Retention Suture Text: Retention sutures were placed to support the closure and prevent dehiscence.
Adjacent Tissue Transfer Text: The defect edges were debeveled with a #15 scalpel blade.  Given the location of the defect and the proximity to free margins an adjacent tissue transfer was deemed most appropriate.  Using a sterile surgical marker, an appropriate flap was drawn incorporating the defect and placing the expected incisions within the relaxed skin tension lines where possible.  The area thus outlined was incised deep to adipose tissue with a #15 scalpel blade.  The skin margins were undermined to an appropriate distance in all directions utilizing iris scissors and carried over to close the primary defect.
Consent (Spinal Accessory)/Introductory Paragraph: The rationale for Mohs was explained to the patient and consent was obtained. The risks, benefits and alternatives to therapy were discussed in detail. Specifically, the risks of damage to the spinal accessory nerve, infection, scarring, bleeding, prolonged wound healing, incomplete removal, allergy to anesthesia, and recurrence were addressed. Prior to the procedure, the treatment site was clearly identified and confirmed by the patient. All components of Universal Protocol/PAUSE Rule completed.
Melolabial Transposition Flap Text: Due to geometric and functional constraints, a flap reconstruction was performed to reconstruct the defect.  To that end, adjacent tissue was incised and carried over to close the defect in the following manner: The defect edges were debeveled with a #15 scalpel blade.  Given the location of the defect and the proximity to free margins a melolabial flap was deemed most appropriate.  Using a sterile surgical marker, an appropriate melolabial transposition flap was drawn incorporating the defect.    The area thus outlined was incised deep to adipose tissue with a #15 scalpel blade.  The skin margins were undermined to an appropriate distance in all directions utilizing iris scissors.
Orbicularis Oris Muscle Flap Text: Due to geometric and functional constraints, a flap reconstruction was performed to reconstruct the defect.  To that end, adjacent tissue was incised and carried over to close the defect in the following manner: The defect edges were debeveled with a #15 scalpel blade.  Given that the defect affected the competency of the oral sphincter an orbicularis oris muscle flap was deemed most appropriate to restore this competency and normal muscle function.  Using a sterile surgical marker, an appropriate flap was drawn incorporating the defect. The area thus outlined was incised with a #15 scalpel blade.
Brow Lift Text: A midfrontal incision was made medially to the defect to allow access to the tissues just superior to the left eyebrow. Following careful dissection inferiorly in a supraperiosteal plane to the level of the left eyebrow, several 3-0 monocryl sutures were used to resuspend the eyebrow orbicularis oculi muscular unit to the superior frontal bone periosteum. This resulted in an appropriate reapproximation of static eyebrow symmetry and correction of the left brow ptosis.
Closure 2 Information: This tab is for additional flaps and grafts, including complex repair and grafts and complex repair and flaps. You can also specify a different location for the additional defect, if the location is the same you do not need to select a new one. We will insert the automated text for the repair you select below just as we do for solitary flaps and grafts. Please note that at this time if you select a location with a different insurance zone you will need to override the ICD10 and CPT if appropriate.
O-T Advancement Flap Text: Due to geometric and functional constraints, a flap reconstruction was performed to reconstruct the defect.  To that end, adjacent tissue was incised and carried over to close the defect in the following manner: The defect edges were debeveled with a #15 scalpel blade.  Given the location of the defect, shape of the defect and the proximity to free margins an O-T advancement flap was deemed most appropriate.  Using a sterile surgical marker, an appropriate advancement flap was drawn incorporating the defect and placing the expected incisions within the relaxed skin tension lines where possible.    The area thus outlined was incised deep to adipose tissue with a #15 scalpel blade.  The skin margins were undermined to an appropriate distance in all directions utilizing iris scissors.
Mixed Nodular And Micronodular Bcc Histology Text: There were aggregates of basaloid cells in a nodular and micro nodular pattern.
Home Suture Removal Text: Patient was provided instructions on removing sutures and will remove their sutures at home.  If they have any questions or difficulties they will call the office.
Mart-1 - Positive Histology Text: MART-1 staining demonstrates areas of higher density and clustering of melanocytes with Pagetoid spread upwards within the epidermis. The surgical margins are positive for tumor cells.
Incidental Squamous Cell Carcinoma In Situ Histology Text: Incidental SCIS was noted at the periphery of the Mohs section.  If clear margins were not obtained, patient was advised to considered 35%TCA followed by topical 5FU once fully healed.
Area L Indication Text: Tumors in this location are included in Area L (trunk and extremities).  Mohs surgery is indicated for larger tumors, or tumors with aggressive histologic features, in these anatomic locations.  The size of x cm plus 2 mm margin of excision equals tumor size of at least x cm and meets criteria for Mohs surgery for this location according to CPT definition of malignant tumor size that includes margin of excision.  It also meets appropriate use criteria (AUC) for Mohs surgery.
Interpolation Flap Text: Due to geometric and functional constraints, a flap reconstruction was performed to reconstruct the defect.  To that end, adjacent tissue was incised and carried over to close the defect in the following manner: A decision was made to reconstruct the defect utilizing an interpolation axial flap and a staged reconstruction.  A telfa template was made of the defect.  This telfa template was then used to outline the interpolation flap.  The donor area for the pedicle flap was then injected with anesthesia.  The flap was excised through the skin and subcutaneous tissue down to the layer of the underlying musculature.  The interpolation flap was carefully excised within this deep plane to maintain its blood supply.  The edges of the donor site were undermined.   The donor site was closed in a primary fashion.  The pedicle was then rotated into position and sutured.  Once the tube was sutured into place, adequate blood supply was confirmed with blanching and refill.  The pedicle was then wrapped with xeroform gauze and dressed appropriately with a telfa and gauze bandage to ensure continued blood supply and protect the attached pedicle.
Same Histology In Subsequent Stages Text: The pattern and morphology of the tumor is as described in the first stage.
Posterior Auricular Interpolation Flap Text: Due to geometric and functional constraints, a flap reconstruction was performed to reconstruct the defect.  To that end, adjacent tissue was incised and carried over to close the defect in the following manner: A decision was made to reconstruct the defect utilizing an interpolation axial flap and a staged reconstruction.  A telfa template was made of the defect.  This telfa template was then used to outline the posterior auricular interpolation flap.  The donor area for the pedicle flap was then injected with anesthesia.  The flap was excised through the skin and subcutaneous tissue down to the layer of the underlying musculature.  The pedicle flap was carefully excised within this deep plane to maintain its blood supply.  The edges of the donor site were undermined.   The donor site was closed in a primary fashion.  The pedicle was then rotated into position and sutured.  Once the tube was sutured into place, adequate blood supply was confirmed with blanching and refill.  The pedicle was then wrapped with xeroform gauze and dressed appropriately with a telfa and gauze bandage to ensure continued blood supply and protect the attached pedicle.
Rectangular Flap Text: The defect edges were debeveled with a #15 scalpel blade. Given the location of the defect and the proximity to free margins a rectangular flap was deemed most appropriate. Using a sterile surgical marker, an appropriate rectangular flap was drawn incorporating the defect. The area thus outlined was incised deep to adipose tissue with a #15 scalpel blade. The skin margins were undermined to an appropriate distance in all directions utilizing iris scissors. Following this, the designed flap was carried over into the primary defect and sutured into place.
Bill 59 Modifier?: No - Continue to Bill 79 Modifier
Localized Dermabrasion With Sand Papertext: The patient was draped in routine manner.  Localized dermabrasion using sterile sand paper was performed in routine manner to papillary dermis. This spot dermabrasion is being performed to complete skin cancer reconstruction. It also will eliminate the other sun damaged precancerous cells that are known to be part of the regional effect of a lifetime's worth of sun exposure. This localized dermabrasion is therapeutic and should not be considered cosmetic in any regard.
Localized Dermabrasion With 15 Blade Text: The patient was draped in routine manner.  Localized dermabrasion using a 15 blade was performed in routine manner to papillary dermis. This spot dermabrasion is being performed to complete skin cancer reconstruction. It also will eliminate the other sun damaged precancerous cells that are known to be part of the regional effect of a lifetime's worth of sun exposure. This localized dermabrasion is therapeutic and should not be considered cosmetic in any regard.
Eyelid Partial Thickness Repair - 13675: The eyelid defect was partial thickness which required a wedge repair of the eyelid. Special care was taken to ensure that the eyelid margin was realligned when placing sutures.
Which Instrument Did You Use For Dermabrasion?: Wire Brush
Nasalis Myocutaneous Flap Text: Using a #15 blade, an incision was made around the donor flap to the level of the nasalis muscle. Wide lateral undermining was then performed in both the subcutaneous plane above the nasalis muscle, and in a submuscular plane just above periosteum. This allowed the formation of a free nasalis muscle axial pedicle which was still attached to the actual cutaneous flap, increasing its mobility and vascular viability. Hemostasis was obtained with pinpoint electrocoagulation. The flap was mobilized into position and the pivotal anchor points positioned and stabilized with buried interrupted sutures. Subcutaneous and dermal tissues were closed in a multilayered fashion with sutures. Tissue redundancies were excised, and the epidermal edges were apposed without significant tension and sutured with sutures.

## 2024-05-14 NOTE — PROCEDURE: SURGICAL DECISION MAKING
Identified Risk Factors Documented?: yes
Date Of Surgery - Today Or Tomorrow?: today
Discussion: After the Mohs procedure was complete, a separate and distinct evaluation and management was performed for the resultant surgical defect for potential reconstruction using flap or graft.  Complications and risk of morbidity of each were discussed including (but not limited to) scarring, which could distort a free anatomic margin of the eyelid, nose, ear or lip, thereby negatively affecting body function of these structures.
Complexity (Necessary For Coding; Major - 90 Day Global With Some Exceptions; Minor - 10 Day Global): major
Initial Decision For Surgery?: No
Risk Assessment Explanation (Does Not Render In The Note): Clinical determination of the probability and/or consequences of an event, such as surgery. Clinical assessment of the level of risk is affected by the nature of the event under consideration for the patient. Modifier 57 is used to indicate an Evaluation and Management (E/M) service resulted in the initial decision to perform surgery either the day before a major surgery (90 day global) or the day of a major surgery.

## 2024-05-14 NOTE — PROCEDURE: MEDICATION COUNSELING
Birth Control Pills Counseling: Birth Control Pill Counseling: I discussed with the patient the potential side effects of OCPs including but not limited to increased risk of stroke, heart attack, thrombophlebitis, deep venous thrombosis, hepatic adenomas, breast changes, GI upset, headaches, and depression.  The patient verbalized understanding of the proper use and possible adverse effects of OCPs. All of the patient's questions and concerns were addressed.
Itraconazole Pregnancy And Lactation Text: This medication is Pregnancy Category C and it isn't know if it is safe during pregnancy. It is also excreted in breast milk.
Stelara Counseling:  I discussed with the patient the risks of ustekinumab including but not limited to immunosuppression, malignancy, posterior leukoencephalopathy syndrome, and serious infections.  The patient understands that monitoring is required including a PPD at baseline and must alert us or the primary physician if symptoms of infection or other concerning signs are noted.
Sarecycline Counseling: Patient advised regarding possible photosensitivity and discoloration of the teeth, skin, lips, tongue and gums.  Patient instructed to avoid sunlight, if possible.  When exposed to sunlight, patients should wear protective clothing, sunglasses, and sunscreen.  The patient was instructed to call the office immediately if the following severe adverse effects occur:  hearing changes, easy bruising/bleeding, severe headache, or vision changes.  The patient verbalized understanding of the proper use and possible adverse effects of sarecycline.  All of the patient's questions and concerns were addressed.
Aklief Pregnancy And Lactation Text: It is unknown if this medication is safe to use during pregnancy.  It is unknown if this medication is excreted in breast milk.  Breastfeeding women should use the topical cream on the smallest area of the skin for the shortest time needed while breastfeeding.  Do not apply to nipple and areola.
Taltz Counseling: I discussed with the patient the risks of ixekizumab including but not limited to immunosuppression, serious infections, worsening of inflammatory bowel disease and drug reactions.  The patient understands that monitoring is required including a PPD at baseline and must alert us or the primary physician if symptoms of infection or other concerning signs are noted.
Libtayo Pregnancy And Lactation Text: This medication is contraindicated in pregnancy and when breast feeding.
Xelhelderz Pregnancy And Lactation Text: This medication is Pregnancy Category D and is not considered safe during pregnancy.  The risk during breast feeding is also uncertain.
Topical Ketoconazole Counseling: Patient counseled that this medication may cause skin irritation or allergic reactions.  In the event of skin irritation, the patient was advised to reduce the amount of the drug applied or use it less frequently.   The patient verbalized understanding of the proper use and possible adverse effects of ketoconazole.  All of the patient's questions and concerns were addressed.
Imiquimod Counseling:  I discussed with the patient the risks of imiquimod including but not limited to erythema, scaling, itching, weeping, crusting, and pain.  Patient understands that the inflammatory response to imiquimod is variable from person to person and was educated regarded proper titration schedule.  If flu-like symptoms develop, patient knows to discontinue the medication and contact us.
Erythromycin Pregnancy And Lactation Text: This medication is Pregnancy Category B and is considered safe during pregnancy. It is also excreted in breast milk.
Hydroxyzine Counseling: Patient advised that the medication is sedating and not to drive a car after taking this medication.  Patient informed of potential adverse effects including but not limited to dry mouth, urinary retention, and blurry vision.  The patient verbalized understanding of the proper use and possible adverse effects of hydroxyzine.  All of the patient's questions and concerns were addressed.
Cyclophosphamide Pregnancy And Lactation Text: This medication is Pregnancy Category D and it isn't considered safe during pregnancy. This medication is excreted in breast milk.
Humira Counseling:  I discussed with the patient the risks of adalimumab including but not limited to myelosuppression, immunosuppression, autoimmune hepatitis, demyelinating diseases, lymphoma, and serious infections.  The patient understands that monitoring is required including a PPD at baseline and must alert us or the primary physician if symptoms of infection or other concerning signs are noted.
Litfulo Counseling: I discussed with the patient the risks of Litfulo therapy including but not limited to upper respiratory tract infections, shingles, cold sores, and nausea. Live vaccines should be avoided.  This medication has been linked to serious infections; higher rate of mortality; malignancy and lymphoproliferative disorders; major adverse cardiovascular events; thrombosis; gastrointestinal perforations; neutropenia; lymphopenia; anemia; liver enzyme elevations; and lipid elevations.
5-Fu Pregnancy And Lactation Text: This medication is Pregnancy Category X and contraindicated in pregnancy and in women who may become pregnant. It is unknown if this medication is excreted in breast milk.
Rituxan Pregnancy And Lactation Text: This medication is Pregnancy Category C and it isn't know if it is safe during pregnancy. It is unknown if this medication is excreted in breast milk but similar antibodies are known to be excreted.
Solaraze Pregnancy And Lactation Text: This medication is Pregnancy Category B and is considered safe. There is some data to suggest avoiding during the third trimester. It is unknown if this medication is excreted in breast milk.
Thalidomide Pregnancy And Lactation Text: This medication is Pregnancy Category X and is absolutely contraindicated during pregnancy. It is unknown if it is excreted in breast milk.
Colchicine Pregnancy And Lactation Text: This medication is Pregnancy Category C and isn't considered safe during pregnancy. It is excreted in breast milk.
Zyclara Pregnancy And Lactation Text: This medication is Pregnancy Category C. It is unknown if this medication is excreted in breast milk.
Winlevi Counseling:  I discussed with the patient the risks of topical clascoterone including but not limited to erythema, scaling, itching, and stinging. Patient voiced their understanding.
Otezla Counseling: The side effects of Otezla were discussed with the patient, including but not limited to worsening or new depression, weight loss, diarrhea, nausea, upper respiratory tract infection, and headache. Patient instructed to call the office should any adverse effect occur.  The patient verbalized understanding of the proper use and possible adverse effects of Otezla.  All the patient's questions and concerns were addressed.
Odomzo Counseling- I discussed with the patient the risks of Odomzo including but not limited to nausea, vomiting, diarrhea, constipation, weight loss, changes in the sense of taste, decreased appetite, muscle spasms, and hair loss.  The patient verbalized understanding of the proper use and possible adverse effects of Odomzo.  All of the patient's questions and concerns were addressed.
Azelaic Acid Counseling: Patient counseled that medicine may cause skin irritation and to avoid applying near the eyes.  In the event of skin irritation, the patient was advised to reduce the amount of the drug applied or use it less frequently.   The patient verbalized understanding of the proper use and possible adverse effects of azelaic acid.  All of the patient's questions and concerns were addressed.
Picato Counseling:  I discussed with the patient the risks of Picato including but not limited to erythema, scaling, itching, weeping, crusting, and pain.
Sarecycline Pregnancy And Lactation Text: This medication is Pregnancy Category D and not consider safe during pregnancy. It is also excreted in breast milk.
Topical Ketoconazole Pregnancy And Lactation Text: This medication is Pregnancy Category B and is considered safe during pregnancy. It is unknown if it is excreted in breast milk.
Birth Control Pills Pregnancy And Lactation Text: This medication should be avoided if pregnant and for the first 30 days post-partum.
Bimzelx Pregnancy And Lactation Text: This medication crosses the placenta and the safety is uncertain during pregnancy. It is unknown if this medication is present in breast milk.
Low Dose Naltrexone Pregnancy And Lactation Text: Naltrexone is pregnancy category C.  There have been no adequate and well-controlled studies in pregnant women.  It should be used in pregnancy only if the potential benefit justifies the potential risk to the fetus.   Limited data indicates that naltrexone is minimally excreted into breastmilk.
Taltz Pregnancy And Lactation Text: The risk during pregnancy and breastfeeding is uncertain with this medication.
Azithromycin Counseling:  I discussed with the patient the risks of azithromycin including but not limited to GI upset, allergic reaction, drug rash, diarrhea, and yeast infections.
Ketoconazole Counseling:   Patient counseled regarding improving absorption with orange juice.  Adverse effects include but are not limited to breast enlargement, headache, diarrhea, nausea, upset stomach, liver function test abnormalities, taste disturbance, and stomach pain.  There is a rare possibility of liver failure that can occur when taking ketoconazole. The patient understands that monitoring of LFTs may be required, especially at baseline. The patient verbalized understanding of the proper use and possible adverse effects of ketoconazole.  All of the patient's questions and concerns were addressed.
Dapsone Counseling: I discussed with the patient the risks of dapsone including but not limited to hemolytic anemia, agranulocytosis, rashes, methemoglobinemia, kidney failure, peripheral neuropathy, headaches, GI upset, and liver toxicity.  Patients who start dapsone require monitoring including baseline LFTs and weekly CBCs for the first month, then every month thereafter.  The patient verbalized understanding of the proper use and possible adverse effects of dapsone.  All of the patient's questions and concerns were addressed.
Cyclosporine Counseling:  I discussed with the patient the risks of cyclosporine including but not limited to hypertension, gingival hyperplasia,myelosuppression, immunosuppression, liver damage, kidney damage, neurotoxicity, lymphoma, and serious infections. The patient understands that monitoring is required including baseline blood pressure, CBC, CMP, lipid panel and uric acid, and then 1-2 times monthly CMP and blood pressure.
Tranexamic Acid Counseling:  Patient advised of the small risk of bleeding problems with tranexamic acid. They were also instructed to call if they developed any nausea, vomiting or diarrhea. All of the patient's questions and concerns were addressed.
Metronidazole Counseling:  I discussed with the patient the risks of metronidazole including but not limited to seizures, nausea/vomiting, a metallic taste in the mouth, nausea/vomiting and severe allergy.
Hydroxyzine Pregnancy And Lactation Text: This medication is not safe during pregnancy and should not be taken. It is also excreted in breast milk and breast feeding isn't recommended.
Siliq Counseling:  I discussed with the patient the risks of Siliq including but not limited to new or worsening depression, suicidal thoughts and behavior, immunosuppression, malignancy, posterior leukoencephalopathy syndrome, and serious infections.  The patient understands that monitoring is required including a PPD at baseline and must alert us or the primary physician if symptoms of infection or other concerning signs are noted. There is also a special program designed to monitor depression which is required with Siliq.
Drysol Counseling:  I discussed with the patient the risks of drysol/aluminum chloride including but not limited to skin rash, itching, irritation, burning.
Winlevi Pregnancy And Lactation Text: This medication is considered safe during pregnancy and breastfeeding.
Soolantra Counseling: I discussed with the patients the risks of topial Soolantra. This is a medicine which decreases the number of mites and inflammation in the skin. You experience burning, stinging, eye irritation or allergic reactions.  Please call our office if you develop any problems from using this medication.
Otezla Pregnancy And Lactation Text: This medication is Pregnancy Category C and it isn't known if it is safe during pregnancy. It is unknown if it is excreted in breast milk.
Humira Pregnancy And Lactation Text: This medication is Pregnancy Category B and is considered safe during pregnancy. It is unknown if this medication is excreted in breast milk.
Litfulo Pregnancy And Lactation Text: Based on animal studies, Lifulo may cause embryo-fetal harm when administered to pregnant women.  The medication should not be used in pregnancy.  Breastfeeding is not recommended during treatment.
Acitretin Counseling:  I discussed with the patient the risks of acitretin including but not limited to hair loss, dry lips/skin/eyes, liver damage, hyperlipidemia, depression/suicidal ideation, photosensitivity.  Serious rare side effects can include but are not limited to pancreatitis, pseudotumor cerebri, bony changes, clot formation/stroke/heart attack.  Patient understands that alcohol is contraindicated since it can result in liver toxicity and significantly prolong the elimination of the drug by many years.
Azithromycin Pregnancy And Lactation Text: This medication is considered safe during pregnancy and is also secreted in breast milk.
Cimzia Counseling:  I discussed with the patient the risks of Cimzia including but not limited to immunosuppression, allergic reactions and infections.  The patient understands that monitoring is required including a PPD at baseline and must alert us or the primary physician if symptoms of infection or other concerning signs are noted.
Tremfya Counseling: I discussed with the patient the risks of guselkumab including but not limited to immunosuppression, serious infections, and drug reactions.  The patient understands that monitoring is required including a PPD at baseline and must alert us or the primary physician if symptoms of infection or other concerning signs are noted.
Tetracycline Counseling: Patient counseled regarding possible photosensitivity and increased risk for sunburn.  Patient instructed to avoid sunlight, if possible.  When exposed to sunlight, patients should wear protective clothing, sunglasses, and sunscreen.  The patient was instructed to call the office immediately if the following severe adverse effects occur:  hearing changes, easy bruising/bleeding, severe headache, or vision changes.  The patient verbalized understanding of the proper use and possible adverse effects of tetracycline.  All of the patient's questions and concerns were addressed. Patient understands to avoid pregnancy while on therapy due to potential birth defects.
Niacinamide Counseling: I recommended taking niacin or niacinamide, also know as vitamin B3, twice daily. Recent evidence suggests that taking vitamin B3 (500 mg twice daily) can reduce the risk of actinic keratoses and non-melanoma skin cancers. Side effects of vitamin B3 include flushing and headache.
Azelaic Acid Pregnancy And Lactation Text: This medication is considered safe during pregnancy and breast feeding.
Opioid Counseling: I discussed with the patient the potential side effects of opioids including but not limited to addiction, altered mental status, and depression. I stressed avoiding alcohol, benzodiazepines, muscle relaxants and sleep aids unless specifically okayed by a physician. The patient verbalized understanding of the proper use and possible adverse effects of opioids. All of the patient's questions and concerns were addressed. They were instructed to flush the remaining pills down the toilet if they did not need them for pain.
Tranexamic Acid Pregnancy And Lactation Text: It is unknown if this medication is safe during pregnancy or breast feeding.
Dapsone Pregnancy And Lactation Text: This medication is Pregnancy Category C and is not considered safe during pregnancy or breast feeding.
Spironolactone Counseling: Patient advised regarding risks of diarrhea, abdominal pain, hyperkalemia, birth defects (for female patients), liver toxicity and renal toxicity. The patient may need blood work to monitor liver and kidney function and potassium levels while on therapy. The patient verbalized understanding of the proper use and possible adverse effects of spironolactone.  All of the patient's questions and concerns were addressed.
Klisyri Counseling:  I discussed with the patient the risks of Klisyri including but not limited to erythema, scaling, itching, weeping, crusting, and pain.
Topical Metronidazole Counseling: Metronidazole is a topical antibiotic medication. You may experience burning, stinging, redness, or allergic reactions.  Please call our office if you develop any problems from using this medication.
Ketoconazole Pregnancy And Lactation Text: This medication is Pregnancy Category C and it isn't know if it is safe during pregnancy. It is also excreted in breast milk and breast feeding isn't recommended.
Soolantra Pregnancy And Lactation Text: This medication is Pregnancy Category C. This medication is considered safe during breast feeding.
Acitretin Pregnancy And Lactation Text: This medication is Pregnancy Category X and should not be given to women who are pregnant or may become pregnant in the future. This medication is excreted in breast milk.
Metronidazole Pregnancy And Lactation Text: This medication is Pregnancy Category B and considered safe during pregnancy.  It is also excreted in breast milk.
VTAMA Counseling: I discussed with the patient that VTAMA is not for use in the eyes, mouth or mouth. They should call the office if they develop any signs of allergic reactions to VTAMA. The patient verbalized understanding of the proper use and possible adverse effects of VTAMA.  All of the patient's questions and concerns were addressed.
Cyclosporine Pregnancy And Lactation Text: This medication is Pregnancy Category C and it isn't know if it is safe during pregnancy. This medication is excreted in breast milk.
Olumiant Counseling: I discussed with the patient the risks of Olumiant therapy including but not limited to upper respiratory tract infections, shingles, cold sores, and nausea. Live vaccines should be avoided.  This medication has been linked to serious infections; higher rate of mortality; malignancy and lymphoproliferative disorders; major adverse cardiovascular events; thrombosis; gastrointestinal perforations; neutropenia; lymphopenia; anemia; liver enzyme elevations; and lipid elevations.
Cimzia Pregnancy And Lactation Text: This medication crosses the placenta but can be considered safe in certain situations. Cimzia may be excreted in breast milk.
Protopic Counseling: Patient may experience a mild burning sensation during topical application. Protopic is not approved in children less than 2 years of age. There have been case reports of hematologic and skin malignancies in patients using topical calcineurin inhibitors although causality is questionable.
Oxybutynin Counseling:  I discussed with the patient the risks of oxybutynin including but not limited to skin rash, drowsiness, dry mouth, difficulty urinating, and blurred vision.
Hyrimoz Counseling:  I discussed with the patient the risks of adalimumab including but not limited to myelosuppression, immunosuppression, autoimmune hepatitis, demyelinating diseases, lymphoma, and serious infections.  The patient understands that monitoring is required including a PPD at baseline and must alert us or the primary physician if symptoms of infection or other concerning signs are noted.
Albendazole Counseling:  I discussed with the patient the risks of albendazole including but not limited to cytopenia, kidney damage, nausea/vomiting and severe allergy.  The patient understands that this medication is being used in an off-label manner.
Niacinamide Pregnancy And Lactation Text: These medications are considered safe during pregnancy.
Benzoyl Peroxide Counseling: Patient counseled that medicine may cause skin irritation and bleach clothing.  In the event of skin irritation, the patient was advised to reduce the amount of the drug applied or use it less frequently.   The patient verbalized understanding of the proper use and possible adverse effects of benzoyl peroxide.  All of the patient's questions and concerns were addressed.
Bactrim Counseling:  I discussed with the patient the risks of sulfa antibiotics including but not limited to GI upset, allergic reaction, drug rash, diarrhea, dizziness, photosensitivity, and yeast infections.  Rarely, more serious reactions can occur including but not limited to aplastic anemia, agranulocytosis, methemoglobinemia, blood dyscrasias, liver or kidney failure, lung infiltrates or desquamative/blistering drug rashes.
Opioid Pregnancy And Lactation Text: These medications can lead to premature delivery and should be avoided during pregnancy. These medications are also present in breast milk in small amounts.
Terbinafine Counseling: Patient counseling regarding adverse effects of terbinafine including but not limited to headache, diarrhea, rash, upset stomach, liver function test abnormalities, itching, taste/smell disturbance, nausea, abdominal pain, and flatulence.  There is a rare possibility of liver failure that can occur when taking terbinafine.  The patient understands that a baseline LFT and kidney function test may be required. The patient verbalized understanding of the proper use and possible adverse effects of terbinafine.  All of the patient's questions and concerns were addressed.
Topical Metronidazole Pregnancy And Lactation Text: This medication is Pregnancy Category B and considered safe during pregnancy.  It is also considered safe to use while breastfeeding.
Methotrexate Counseling:  Patient counseled regarding adverse effects of methotrexate including but not limited to nausea, vomiting, abnormalities in liver function tests. Patients may develop mouth sores, rash, diarrhea, and abnormalities in blood counts. The patient understands that monitoring is required including LFT's and blood counts.  There is a rare possibility of scarring of the liver and lung problems that can occur when taking methotrexate. Persistent nausea, loss of appetite, pale stools, dark urine, cough, and shortness of breath should be reported immediately. Patient advised to discontinue methotrexate treatment at least three months before attempting to become pregnant.  I discussed the need for folate supplements while taking methotrexate.  These supplements can decrease side effects during methotrexate treatment. The patient verbalized understanding of the proper use and possible adverse effects of methotrexate.  All of the patient's questions and concerns were addressed.
Minocycline Counseling: Patient advised regarding possible photosensitivity and discoloration of the teeth, skin, lips, tongue and gums.  Patient instructed to avoid sunlight, if possible.  When exposed to sunlight, patients should wear protective clothing, sunglasses, and sunscreen.  The patient was instructed to call the office immediately if the following severe adverse effects occur:  hearing changes, easy bruising/bleeding, severe headache, or vision changes.  The patient verbalized understanding of the proper use and possible adverse effects of minocycline.  All of the patient's questions and concerns were addressed.
Klisyri Pregnancy And Lactation Text: It is unknown if this medication can harm a developing fetus or if it is excreted in breast milk.
Topical Retinoid counseling:  Patient advised to apply a pea-sized amount only at bedtime and wait 30 minutes after washing their face before applying.  If too drying, patient may add a non-comedogenic moisturizer. The patient verbalized understanding of the proper use and possible adverse effects of retinoids.  All of the patient's questions and concerns were addressed.
Olumiant Pregnancy And Lactation Text: Based on animal studies, Olumiant may cause embryo-fetal harm when administered to pregnant women.  The medication should not be used in pregnancy.  Breastfeeding is not recommended during treatment.
Valtrex Counseling: I discussed with the patient the risks of valacyclovir including but not limited to kidney damage, nausea, vomiting and severe allergy.  The patient understands that if the infection seems to be worsening or is not improving, they are to call.
Gabapentin Counseling: I discussed with the patient the risks of gabapentin including but not limited to dizziness, somnolence, fatigue and ataxia.
Spironolactone Pregnancy And Lactation Text: This medication can cause feminization of the male fetus and should be avoided during pregnancy. The active metabolite is also found in breast milk.
Dutasteride Male Counseling: Dustasteride Counseling:  I discussed with the patient the risks of use of dutasteride including but not limited to decreased libido, decreased ejaculate volume, and gynecomastia. Women who can become pregnant should not handle medication.  All of the patient's questions and concerns were addressed.
Bexarotene Counseling:  I discussed with the patient the risks of bexarotene including but not limited to hair loss, dry lips/skin/eyes, liver abnormalities, hyperlipidemia, pancreatitis, depression/suicidal ideation, photosensitivity, drug rash/allergic reactions, hypothyroidism, anemia, leukopenia, infection, cataracts, and teratogenicity.  Patient understands that they will need regular blood tests to check lipid profile, liver function tests, white blood cell count, thyroid function tests and pregnancy test if applicable.
Cephalexin Pregnancy And Lactation Text: This medication is Pregnancy Category B and considered safe during pregnancy.  It is also excreted in breast milk but can be used safely for shorter doses.
Albendazole Pregnancy And Lactation Text: This medication is Pregnancy Category C and it isn't known if it is safe during pregnancy. It is also excreted in breast milk.
Vtama Pregnancy And Lactation Text: It is unknown if this medication can cause problems during pregnancy and breastfeeding.
Elidel Counseling: Patient may experience a mild burning sensation during topical application. Elidel is not approved in children less than 2 years of age. There have been case reports of hematologic and skin malignancies in patients using topical calcineurin inhibitors although causality is questionable.
Benzoyl Peroxide Pregnancy And Lactation Text: This medication is Pregnancy Category C. It is unknown if benzoyl peroxide is excreted in breast milk.
Topical Steroids Counseling: I discussed with the patient that prolonged use of topical steroids can result in the increased appearance of superficial blood vessels (telangiectasias), lightening (hypopigmentation) and thinning of the skin (atrophy).  Patient understands to avoid using high potency steroids in skin folds, the groin or the face.  The patient verbalized understanding of the proper use and possible adverse effects of topical steroids.  All of the patient's questions and concerns were addressed.
Protopic Pregnancy And Lactation Text: This medication is Pregnancy Category C. It is unknown if this medication is excreted in breast milk when applied topically.
Nsaids Counseling: NSAID Counseling: I discussed with the patient that NSAIDs should be taken with food. Prolonged use of NSAIDs can result in the development of stomach ulcers.  Patient advised to stop taking NSAIDs if abdominal pain occurs.  The patient verbalized understanding of the proper use and possible adverse effects of NSAIDs.  All of the patient's questions and concerns were addressed.
Cosentyx Counseling:  I discussed with the patient the risks of Cosentyx including but not limited to worsening of Crohn's disease, immunosuppression, allergic reactions and infections.  The patient understands that monitoring is required including a PPD at baseline and must alert us or the primary physician if symptoms of infection or other concerning signs are noted.
Xolair Counseling:  Patient informed of potential adverse effects including but not limited to fever, muscle aches, rash and allergic reactions.  The patient verbalized understanding of the proper use and possible adverse effects of Xolair.  All of the patient's questions and concerns were addressed.
Terbinafine Pregnancy And Lactation Text: This medication is Pregnancy Category B and is considered safe during pregnancy. It is also excreted in breast milk and breast feeding isn't recommended.
Methotrexate Pregnancy And Lactation Text: This medication is Pregnancy Category X and is known to cause fetal harm. This medication is excreted in breast milk.
Bactrim Pregnancy And Lactation Text: This medication is Pregnancy Category D and is known to cause fetal risk.  It is also excreted in breast milk.
Valtrex Pregnancy And Lactation Text: this medication is Pregnancy Category B and is considered safe during pregnancy. This medication is not directly found in breast milk but it's metabolite acyclovir is present.
Minoxidil Counseling: Minoxidil is a topical medication which can increase blood flow where it is applied. It is uncertain how this medication increases hair growth. Side effects are uncommon and include stinging and allergic reactions.
Propranolol Counseling:  I discussed with the patient the risks of propranolol including but not limited to low heart rate, low blood pressure, low blood sugar, restlessness and increased cold sensitivity. They should call the office if they experience any of these side effects.
Arava Counseling:  Patient counseled regarding adverse effects of Arava including but not limited to nausea, vomiting, abnormalities in liver function tests. Patients may develop mouth sores, rash, diarrhea, and abnormalities in blood counts. The patient understands that monitoring is required including LFTs and blood counts.  There is a rare possibility of scarring of the liver and lung problems that can occur when taking methotrexate. Persistent nausea, loss of appetite, pale stools, dark urine, cough, and shortness of breath should be reported immediately. Patient advised to discontinue Arava treatment and consult with a physician prior to attempting conception. The patient will have to undergo a treatment to eliminate Arava from the body prior to conception.
Ilumya Counseling: I discussed with the patient the risks of tildrakizumab including but not limited to immunosuppression, malignancy, posterior leukoencephalopathy syndrome, and serious infections.  The patient understands that monitoring is required including a PPD at baseline and must alert us or the primary physician if symptoms of infection or other concerning signs are noted.
Dutasteride Female Counseling: Dutasteride Counseling:  I discussed with the patient the risks of use of dutasteride including but not limited to decreased libido and sexual dysfunction. Explained the teratogenic nature of the medication and stressed the importance of not getting pregnant during treatment. All of the patient's questions and concerns were addressed.
Rinvoq Counseling: I discussed with the patient the risks of Rinvoq therapy including but not limited to upper respiratory tract infections, shingles, cold sores, bronchitis, nausea, cough, fever, acne, and headache. Live vaccines should be avoided.  This medication has been linked to serious infections; higher rate of mortality; malignancy and lymphoproliferative disorders; major adverse cardiovascular events; thrombosis; thrombocytopenia, anemia, and neutropenia; lipid elevations; liver enzyme elevations; and gastrointestinal perforations.
Fluconazole Counseling:  Patient counseled regarding adverse effects of fluconazole including but not limited to headache, diarrhea, nausea, upset stomach, liver function test abnormalities, taste disturbance, and stomach pain.  There is a rare possibility of liver failure that can occur when taking fluconazole.  The patient understands that monitoring of LFTs and kidney function test may be required, especially at baseline. The patient verbalized understanding of the proper use and possible adverse effects of fluconazole.  All of the patient's questions and concerns were addressed.
Xolair Pregnancy And Lactation Text: This medication is Pregnancy Category B and is considered safe during pregnancy. This medication is excreted in breast milk.
Clindamycin Counseling: I counseled the patient regarding use of clindamycin as an antibiotic for prophylactic and/or therapeutic purposes. Clindamycin is active against numerous classes of bacteria, including skin bacteria. Side effects may include nausea, diarrhea, gastrointestinal upset, rash, hives, yeast infections, and in rare cases, colitis.
Ivermectin Counseling:  Patient instructed to take medication on an empty stomach with a full glass of water.  Patient informed of potential adverse effects including but not limited to nausea, diarrhea, dizziness, itching, and swelling of the extremities or lymph nodes.  The patient verbalized understanding of the proper use and possible adverse effects of ivermectin.  All of the patient's questions and concerns were addressed.
Azathioprine Counseling:  I discussed with the patient the risks of azathioprine including but not limited to myelosuppression, immunosuppression, hepatotoxicity, lymphoma, and infections.  The patient understands that monitoring is required including baseline LFTs, Creatinine, possible TPMP genotyping and weekly CBCs for the first month and then every 2 weeks thereafter.  The patient verbalized understanding of the proper use and possible adverse effects of azathioprine.  All of the patient's questions and concerns were addressed.
Bexarotene Pregnancy And Lactation Text: This medication is Pregnancy Category X and should not be given to women who are pregnant or may become pregnant. This medication should not be used if you are breast feeding.
Carac Counseling:  I discussed with the patient the risks of Carac including but not limited to erythema, scaling, itching, weeping, crusting, and pain.
Zoryve Counseling:  I discussed with the patient that Zoryve is not for use in the eyes, mouth or vagina. The most commonly reported side effects include diarrhea, headache, insomnia, application site pain, upper respiratory tract infections, and urinary tract infections.  All of the patient's questions and concerns were addressed.
Qbrexza Counseling:  I discussed with the patient the risks of Qbrexza including but not limited to headache, mydriasis, blurred vision, dry eyes, nasal dryness, dry mouth, dry throat, dry skin, urinary hesitation, and constipation.  Local skin reactions including erythema, burning, stinging, and itching can also occur.
Use Enhanced Medication Counseling?: No
Nsaids Pregnancy And Lactation Text: These medications are considered safe up to 30 weeks gestation. It is excreted in breast milk.
Topical Steroids Applications Pregnancy And Lactation Text: Most topical steroids are considered safe to use during pregnancy and lactation.  Any topical steroid applied to the breast or nipple should be washed off before breastfeeding.
Quinolones Counseling:  I discussed with the patient the risks of fluoroquinolones including but not limited to GI upset, allergic reaction, drug rash, diarrhea, dizziness, photosensitivity, yeast infections, liver function test abnormalities, tendonitis/tendon rupture.
Minoxidil Pregnancy And Lactation Text: This medication has not been assigned a Pregnancy Risk Category but animal studies failed to show danger with the topical medication. It is unknown if the medication is excreted in breast milk.
Cephalexin Counseling: I counseled the patient regarding use of cephalexin as an antibiotic for prophylactic and/or therapeutic purposes. Cephalexin (commonly prescribed under brand name Keflex) is a cephalosporin antibiotic which is active against numerous classes of bacteria, including most skin bacteria. Side effects may include nausea, diarrhea, gastrointestinal upset, rash, hives, yeast infections, and in rare cases, hepatitis, kidney disease, seizures, fever, confusion, neurologic symptoms, and others. Patients with severe allergies to penicillin medications are cautioned that there is about a 10% incidence of cross-reactivity with cephalosporins. When possible, patients with penicillin allergies should use alternatives to cephalosporins for antibiotic therapy.
Glycopyrrolate Counseling:  I discussed with the patient the risks of glycopyrrolate including but not limited to skin rash, drowsiness, dry mouth, difficulty urinating, and blurred vision.
Prednisone Counseling:  I discussed with the patient the risks of prolonged use of prednisone including but not limited to weight gain, insomnia, osteoporosis, mood changes, diabetes, susceptibility to infection, glaucoma and high blood pressure.  In cases where prednisone use is prolonged, patients should be monitored with blood pressure checks, serum glucose levels and an eye exam.  Additionally, the patient may need to be placed on GI prophylaxis, PCP prophylaxis, and calcium and vitamin D supplementation and/or a bisphosphonate.  The patient verbalized understanding of the proper use and the possible adverse effects of prednisone.  All of the patient's questions and concerns were addressed.
Rinvoq Pregnancy And Lactation Text: Based on animal studies, Rinvoq may cause embryo-fetal harm when administered to pregnant women.  The medication should not be used in pregnancy.  Breastfeeding is not recommended during treatment and for 6 days after the last dose.
Tazorac Counseling:  Patient advised that medication is irritating and drying.  Patient may need to apply sparingly and wash off after an hour before eventually leaving it on overnight.  The patient verbalized understanding of the proper use and possible adverse effects of tazorac.  All of the patient's questions and concerns were addressed.
Clindamycin Pregnancy And Lactation Text: This medication can be used in pregnancy if certain situations. Clindamycin is also present in breast milk.
Azathioprine Pregnancy And Lactation Text: This medication is Pregnancy Category D and isn't considered safe during pregnancy. It is unknown if this medication is excreted in breast milk.
Cimetidine Counseling:  I discussed with the patient the risks of Cimetidine including but not limited to gynecomastia, headache, diarrhea, nausea, drowsiness, arrhythmias, pancreatitis, skin rashes, psychosis, bone marrow suppression and kidney toxicity.
Eucrisa Counseling: Patient may experience a mild burning sensation during topical application. Eucrisa is not approved in children less than 3 months of age.
Propranolol Pregnancy And Lactation Text: This medication is Pregnancy Category C and it isn't known if it is safe during pregnancy. It is excreted in breast milk.
Qbrexza Pregnancy And Lactation Text: There is no available data on Qbrexza use in pregnant women.  There is no available data on Qbrexza use in lactation.
Isotretinoin Counseling: Patient should get monthly blood tests, not donate blood, not drive at night if vision affected, not share medication, and not undergo elective surgery for 6 months after tx completed. Side effects reviewed, pt to contact office should one occur.
Dutasteride Pregnancy And Lactation Text: This medication is absolutely contraindicated in women, especially during pregnancy and breast feeding. Feminization of male fetuses is possible if taking while pregnant.
Simponi Counseling:  I discussed with the patient the risks of golimumab including but not limited to myelosuppression, immunosuppression, autoimmune hepatitis, demyelinating diseases, lymphoma, and serious infections.  The patient understands that monitoring is required including a PPD at baseline and must alert us or the primary physician if symptoms of infection or other concerning signs are noted.
Dupixent Counseling: I discussed with the patient the risks of dupilumab including but not limited to eye infection and irritation, cold sores, injection site reactions, worsening of asthma, allergic reactions and increased risk of parasitic infection.  Live vaccines should be avoided while taking dupilumab. Dupilumab will also interact with certain medications such as warfarin and cyclosporine. The patient understands that monitoring is required and they must alert us or the primary physician if symptoms of infection or other concerning signs are noted.
Olanzapine Counseling- I discussed with the patient the common side effects of olanzapine including but are not limited to: lack of energy, dry mouth, increased appetite, sleepiness, tremor, constipation, dizziness, changes in behavior, or restlessness.  Explained that teenagers are more likely to experience headaches, abdominal pain, pain in the arms or legs, tiredness, and sleepiness.  Serious side effects include but are not limited: increased risk of death in elderly patients who are confused, have memory loss, or dementia-related psychosis; hyperglycemia; increased cholesterol and triglycerides; and weight gain.
Tazorac Pregnancy And Lactation Text: This medication is not safe during pregnancy. It is unknown if this medication is excreted in breast milk.
Glycopyrrolate Pregnancy And Lactation Text: This medication is Pregnancy Category B and is considered safe during pregnancy. It is unknown if it is excreted breast milk.
Mirvaso Counseling: Mirvaso is a topical medication which can decrease superficial blood flow where applied. Side effects are uncommon and include stinging, redness and allergic reactions.
Topical Sulfur Applications Counseling: Topical Sulfur Counseling: Patient counseled that this medication may cause skin irritation or allergic reactions.  In the event of skin irritation, the patient was advised to reduce the amount of the drug applied or use it less frequently.   The patient verbalized understanding of the proper use and possible adverse effects of topical sulfur application.  All of the patient's questions and concerns were addressed.
Detail Level: Simple
Isotretinoin Pregnancy And Lactation Text: This medication is Pregnancy Category X and is considered extremely dangerous during pregnancy. It is unknown if it is excreted in breast milk.
Griseofulvin Counseling:  I discussed with the patient the risks of griseofulvin including but not limited to photosensitivity, cytopenia, liver damage, nausea/vomiting and severe allergy.  The patient understands that this medication is best absorbed when taken with a fatty meal (e.g., ice cream or french fries).
Finasteride Male Counseling: Finasteride Counseling:  I discussed with the patient the risks of use of finasteride including but not limited to decreased libido, decreased ejaculate volume, gynecomastia, and depression. Women should not handle medication.  All of the patient's questions and concerns were addressed.
Cellcept Counseling:  I discussed with the patient the risks of mycophenolate mofetil including but not limited to infection/immunosuppression, GI upset, hypokalemia, hypercholesterolemia, bone marrow suppression, lymphoproliferative disorders, malignancy, GI ulceration/bleed/perforation, colitis, interstitial lung disease, kidney failure, progressive multifocal leukoencephalopathy, and birth defects.  The patient understands that monitoring is required including a baseline creatinine and regular CBC testing. In addition, patient must alert us immediately if symptoms of infection or other concerning signs are noted.
Erivedge Counseling- I discussed with the patient the risks of Erivedge including but not limited to nausea, vomiting, diarrhea, constipation, weight loss, changes in the sense of taste, decreased appetite, muscle spasms, and hair loss.  The patient verbalized understanding of the proper use and possible adverse effects of Erivedge.  All of the patient's questions and concerns were addressed.
Sotyktu Counseling:  I discussed the most common side effects of Sotyktu including: common cold, sore throat, sinus infections, cold sores, canker sores, folliculitis, and acne.  I also discussed more serious side effects of Sotyktu including but not limited to: serious allergic reactions; increased risk for infections such as TB; cancers such as lymphomas; rhabdomyolysis and elevated CPK; and elevated triglycerides and liver enzymes. 
Doxycycline Counseling:  Patient counseled regarding possible photosensitivity and increased risk for sunburn.  Patient instructed to avoid sunlight, if possible.  When exposed to sunlight, patients should wear protective clothing, sunglasses, and sunscreen.  The patient was instructed to call the office immediately if the following severe adverse effects occur:  hearing changes, easy bruising/bleeding, severe headache, or vision changes.  The patient verbalized understanding of the proper use and possible adverse effects of doxycycline.  All of the patient's questions and concerns were addressed.
SSKI Counseling:  I discussed with the patient the risks of SSKI including but not limited to thyroid abnormalities, metallic taste, GI upset, fever, headache, acne, arthralgias, paraesthesias, lymphadenopathy, easy bleeding, arrhythmias, and allergic reaction.
Infliximab Counseling:  I discussed with the patient the risks of infliximab including but not limited to myelosuppression, immunosuppression, autoimmune hepatitis, demyelinating diseases, lymphoma, and serious infections.  The patient understands that monitoring is required including a PPD at baseline and must alert us or the primary physician if symptoms of infection or other concerning signs are noted.
Dupixent Pregnancy And Lactation Text: This medication likely crosses the placenta but the risk for the fetus is uncertain. This medication is excreted in breast milk.
Rhofade Counseling: Rhofade is a topical medication which can decrease superficial blood flow where applied. Side effects are uncommon and include stinging, redness and allergic reactions.
Olanzapine Pregnancy And Lactation Text: This medication is pregnancy category C.   There are no adequate and well controlled trials with olanzapine in pregnant females.  Olanzapine should be used during pregnancy only if the potential benefit justifies the potential risk to the fetus.   In a study in lactating healthy women, olanzapine was excreted in breast milk.  It is recommended that women taking olanzapine should not breast feed.
Clofazimine Counseling:  I discussed with the patient the risks of clofazimine including but not limited to skin and eye pigmentation, liver damage, nausea/vomiting, gastrointestinal bleeding and allergy.
Calcipotriene Counseling:  I discussed with the patient the risks of calcipotriene including but not limited to erythema, scaling, itching, and irritation.
Topical Sulfur Applications Pregnancy And Lactation Text: This medication is considered safe during pregnancy and breast feeding secondary to limited systemic absorption.
Mirvaso Pregnancy And Lactation Text: This medication has not been assigned a Pregnancy Risk Category. It is unknown if the medication is excreted in breast milk.
Sski Pregnancy And Lactation Text: This medication is Pregnancy Category D and isn't considered safe during pregnancy. It is excreted in breast milk.
Rifampin Counseling: I discussed with the patient the risks of rifampin including but not limited to liver damage, kidney damage, red-orange body fluids, nausea/vomiting and severe allergy.
Adbry Counseling: I discussed with the patient the risks of tralokinumab including but not limited to eye infection and irritation, cold sores, injection site reactions, worsening of asthma, allergic reactions and increased risk of parasitic infection.  Live vaccines should be avoided while taking tralokinumab. The patient understands that monitoring is required and they must alert us or the primary physician if symptoms of infection or other concerning signs are noted.
Finasteride Female Counseling: Finasteride Counseling:  I discussed with the patient the risks of use of finasteride including but not limited to decreased libido and sexual dysfunction. Explained the teratogenic nature of the medication and stressed the importance of not getting pregnant during treatment. All of the patient's questions and concerns were addressed.
Topical Clindamycin Counseling: Patient counseled that this medication may cause skin irritation or allergic reactions.  In the event of skin irritation, the patient was advised to reduce the amount of the drug applied or use it less frequently.   The patient verbalized understanding of the proper use and possible adverse effects of clindamycin.  All of the patient's questions and concerns were addressed.
Sotyktu Pregnancy And Lactation Text: There is insufficient data to evaluate whether or not Sotyktu is safe to use during pregnancy.   It is not known if Sotyktu passes into breast milk and whether or not it is safe to use when breastfeeding.  
Hydroxychloroquine Counseling:  I discussed with the patient that a baseline ophthalmologic exam is needed at the start of therapy and every year thereafter while on therapy. A CBC may also be warranted for monitoring.  The side effects of this medication were discussed with the patient, including but not limited to agranulocytosis, aplastic anemia, seizures, rashes, retinopathy, and liver toxicity. Patient instructed to call the office should any adverse effect occur.  The patient verbalized understanding of the proper use and possible adverse effects of Plaquenil.  All the patient's questions and concerns were addressed.
Doxepin Counseling:  Patient advised that the medication is sedating and not to drive a car after taking this medication. Patient informed of potential adverse effects including but not limited to dry mouth, urinary retention, and blurry vision.  The patient verbalized understanding of the proper use and possible adverse effects of doxepin.  All of the patient's questions and concerns were addressed.
Hydroquinone Counseling:  Patient advised that medication may result in skin irritation, lightening (hypopigmentation), dryness, and burning.  In the event of skin irritation, the patient was advised to reduce the amount of the drug applied or use it less frequently.  Rarely, spots that are treated with hydroquinone can become darker (pseudoochronosis).  Should this occur, patient instructed to stop medication and call the office. The patient verbalized understanding of the proper use and possible adverse effects of hydroquinone.  All of the patient's questions and concerns were addressed.
Griseofulvin Pregnancy And Lactation Text: This medication is Pregnancy Category X and is known to cause serious birth defects. It is unknown if this medication is excreted in breast milk but breast feeding should be avoided.
Calcipotriene Pregnancy And Lactation Text: The use of this medication during pregnancy or lactation is not recommended as there is insufficient data.
Skyrizi Counseling: I discussed with the patient the risks of risankizumab-rzaa including but not limited to immunosuppression, and serious infections.  The patient understands that monitoring is required including a PPD at baseline and must alert us or the primary physician if symptoms of infection or other concerning signs are noted.
Doxycycline Pregnancy And Lactation Text: This medication is Pregnancy Category D and not consider safe during pregnancy. It is also excreted in breast milk but is considered safe for shorter treatment courses.
High Dose Vitamin A Counseling: Side effects reviewed, pt to contact office should one occur.
Wartpeel Counseling:  I discussed with the patient the risks of Wartpeel including but not limited to erythema, scaling, itching, weeping, crusting, and pain.
Cantharidin Counseling:  I discussed with the patient the risks of Cantharidin including but not limited to pain, redness, burning, itching, and blistering.
Cibinqo Counseling: I discussed with the patient the risks of Cibinqo therapy including but not limited to common cold, nausea, headache, cold sores, increased blood CPK levels, dizziness, UTIs, fatigue, acne, and vomitting. Live vaccines should be avoided.  This medication has been linked to serious infections; higher rate of mortality; malignancy and lymphoproliferative disorders; major adverse cardiovascular events; thrombosis; thrombocytopenia and lymphopenia; lipid elevations; and retinal detachment.
Aklief counseling:  Patient advised to apply a pea-sized amount only at bedtime and wait 30 minutes after washing their face before applying.  If too drying, patient may add a non-comedogenic moisturizer.  The most commonly reported side effects including irritation, redness, scaling, dryness, stinging, burning, itching, and increased risk of sunburn.  The patient verbalized understanding of the proper use and possible adverse effects of retinoids.  All of the patient's questions and concerns were addressed.
Enbrel Counseling:  I discussed with the patient the risks of etanercept including but not limited to myelosuppression, immunosuppression, autoimmune hepatitis, demyelinating diseases, lymphoma, and infections.  The patient understands that monitoring is required including a PPD at baseline and must alert us or the primary physician if symptoms of infection or other concerning signs are noted.
Opzelura Counseling:  I discussed with the patient the risks of Opzelura including but not limited to nasopharngitis, bronchitis, ear infection, eosinophila, hives, diarrhea, folliculitis, tonsillitis, and rhinorrhea.  Taken orally, this medication has been linked to serious infections; higher rate of mortality; malignancy and lymphoproliferative disorders; major adverse cardiovascular events; thrombosis; thrombocytopenia, anemia, and neutropenia; and lipid elevations.
Oral Minoxidil Counseling- I discussed with the patient the risks of oral minoxidil including but not limited to shortness of breath, swelling of the feet or ankles, dizziness, lightheadedness, unwanted hair growth and allergic reaction.  The patient verbalized understanding of the proper use and possible adverse effects of oral minoxidil.  All of the patient's questions and concerns were addressed.
Libtayo Counseling- I discussed with the patient the risks of Libtayo including but not limited to nausea, vomiting, diarrhea, and bone or muscle pain.  The patient verbalized understanding of the proper use and possible adverse effects of Libtayo.  All of the patient's questions and concerns were addressed.
Itraconazole Counseling:  I discussed with the patient the risks of itraconazole including but not limited to liver damage, nausea/vomiting, neuropathy, and severe allergy.  The patient understands that this medication is best absorbed when taken with acidic beverages such as non-diet cola or ginger ale.  The patient understands that monitoring is required including baseline LFTs and repeat LFTs at intervals.  The patient understands that they are to contact us or the primary physician if concerning signs are noted.
Rifampin Pregnancy And Lactation Text: This medication is Pregnancy Category C and it isn't know if it is safe during pregnancy. It is also excreted in breast milk and should not be used if you are breast feeding.
Hydroxychloroquine Pregnancy And Lactation Text: This medication has been shown to cause fetal harm but it isn't assigned a Pregnancy Risk Category. There are small amounts excreted in breast milk.
Adbry Pregnancy And Lactation Text: It is unknown if this medication will adversely affect pregnancy or breast feeding.
Cantharidin Pregnancy And Lactation Text: This medication has not been proven safe during pregnancy. It is unknown if this medication is excreted in breast milk.
Thalidomide Counseling: I discussed with the patient the risks of thalidomide including but not limited to birth defects, anxiety, weakness, chest pain, dizziness, cough and severe allergy.
Colchicine Counseling:  Patient counseled regarding adverse effects including but not limited to stomach upset (nausea, vomiting, stomach pain, or diarrhea).  Patient instructed to limit alcohol consumption while taking this medication.  Colchicine may reduce blood counts especially with prolonged use.  The patient understands that monitoring of kidney function and blood counts may be required, especially at baseline. The patient verbalized understanding of the proper use and possible adverse effects of colchicine.  All of the patient's questions and concerns were addressed.
Doxepin Pregnancy And Lactation Text: This medication is Pregnancy Category C and it isn't known if it is safe during pregnancy. It is also excreted in breast milk and breast feeding isn't recommended.
Rituxan Counseling:  I discussed with the patient the risks of Rituxan infusions. Side effects can include infusion reactions, severe drug rashes including mucocutaneous reactions, reactivation of latent hepatitis and other infections and rarely progressive multifocal leukoencephalopathy.  All of the patient's questions and concerns were addressed.
Finasteride Pregnancy And Lactation Text: This medication is absolutely contraindicated during pregnancy. It is unknown if it is excreted in breast milk.
High Dose Vitamin A Pregnancy And Lactation Text: High dose vitamin A therapy is contraindicated during pregnancy and breast feeding.
Xeljanz Counseling: I discussed with the patient the risks of Xeljanz therapy including increased risk of infection, liver issues, headache, diarrhea, or cold symptoms. Live vaccines should be avoided. They were instructed to call if they have any problems.
Cyclophosphamide Counseling:  I discussed with the patient the risks of cyclophosphamide including but not limited to hair loss, hormonal abnormalities, decreased fertility, abdominal pain, diarrhea, nausea and vomiting, bone marrow suppression and infection. The patient understands that monitoring is required while taking this medication.
Cibinqo Pregnancy And Lactation Text: It is unknown if this medication will adversely affect pregnancy or breast feeding.  You should not take this medication if you are currently pregnant or planning a pregnancy or while breastfeeding.
Erythromycin Counseling:  I discussed with the patient the risks of erythromycin including but not limited to GI upset, allergic reaction, drug rash, diarrhea, increase in liver enzymes, and yeast infections.
5-Fu Counseling: 5-Fluorouracil Counseling:  I discussed with the patient the risks of 5-fluorouracil including but not limited to erythema, scaling, itching, weeping, crusting, and pain.
Oral Minoxidil Pregnancy And Lactation Text: This medication should only be used when clearly needed if you are pregnant, attempting to become pregnant or breast feeding.
Solaraze Counseling:  I discussed with the patient the risks of Solaraze including but not limited to erythema, scaling, itching, weeping, crusting, and pain.
Opzelura Pregnancy And Lactation Text: There is insufficient data to evaluate drug-associated risk for major birth defects, miscarriage, or other adverse maternal or fetal outcomes.  There is a pregnancy registry that monitors pregnancy outcomes in pregnant persons exposed to the medication during pregnancy.  It is unknown if this medication is excreted in breast milk.  Do not breastfeed during treatment and for about 4 weeks after the last dose.
Low Dose Naltrexone Counseling- I discussed with the patient the potential risks and side effects of low dose naltrexone including but not limited to: more vivid dreams, headaches, nausea, vomiting, abdominal pain, fatigue, dizziness, and anxiety.
Bimzelx Counseling:  I discussed with the patient the risks of Bimzelx including but not limited to depression, immunosuppression, allergic reactions and infections.  The patient understands that monitoring is required including a PPD at baseline and must alert us or the primary physician if symptoms of infection or other concerning signs are noted.
Zyclara Counseling:  I discussed with the patient the risks of imiquimod including but not limited to erythema, scaling, itching, weeping, crusting, and pain.  Patient understands that the inflammatory response to imiquimod is variable from person to person and was educated regarded proper titration schedule.  If flu-like symptoms develop, patient knows to discontinue the medication and contact us.

## 2024-05-28 ENCOUNTER — APPOINTMENT (OUTPATIENT)
Dept: URBAN - NONMETROPOLITAN AREA SURGERY 7 | Age: 84
Setting detail: DERMATOLOGY
End: 2024-05-28

## 2024-05-28 VITALS — TEMPERATURE: 97.9 F | SYSTOLIC BLOOD PRESSURE: 122 MMHG | DIASTOLIC BLOOD PRESSURE: 78 MMHG | HEART RATE: 68 BPM

## 2024-05-28 DIAGNOSIS — Z48.02 ENCOUNTER FOR REMOVAL OF SUTURES: ICD-10-CM

## 2024-05-28 PROBLEM — C44.319 BASAL CELL CARCINOMA OF SKIN OF OTHER PARTS OF FACE: Status: ACTIVE | Noted: 2024-05-28

## 2024-05-28 PROCEDURE — 12052 INTMD RPR FACE/MM 2.6-5.0 CM: CPT

## 2024-05-28 PROCEDURE — OTHER SURGICAL DECISION MAKING: OTHER

## 2024-05-28 PROCEDURE — OTHER SUTURE REMOVAL (GLOBAL PERIOD): OTHER

## 2024-05-28 PROCEDURE — OTHER MIPS QUALITY: OTHER

## 2024-05-28 PROCEDURE — OTHER MOHS SURGERY: OTHER

## 2024-05-28 PROCEDURE — 17311 MOHS 1 STAGE H/N/HF/G: CPT

## 2024-05-28 PROCEDURE — OTHER COUNSELING: OTHER

## 2024-05-28 ASSESSMENT — LOCATION DETAILED DESCRIPTION DERM: LOCATION DETAILED: LEFT SUPERIOR PARIETAL SCALP

## 2024-05-28 ASSESSMENT — LOCATION ZONE DERM: LOCATION ZONE: SCALP

## 2024-05-28 ASSESSMENT — LOCATION SIMPLE DESCRIPTION DERM: LOCATION SIMPLE: SCALP

## 2024-05-28 NOTE — PROCEDURE: SUTURE REMOVAL (GLOBAL PERIOD)
Detail Level: Detailed
Add 38549 Cpt? (Important Note: In 2017 The Use Of 49582 Is Being Tracked By Cms To Determine Future Global Period Reimbursement For Global Periods): no

## 2024-05-28 NOTE — PROCEDURE: MOHS SURGERY
Complex Repair And Flap Additional Text (Will Appearing After The Standard Complex Repair Text): The complex repair was not sufficient to completely close the primary defect. The remaining additional defect was repaired with the flap mentioned below.
Anesthesia Type: 1% lidocaine with epinephrine
Burow's Graft Text: Because of the full-thickness nature of the wound and surrounding skin tension, a complex linear repair with Burow's graft was planned. After prep and anesthesia, one or two Burow??????s triangles were excised adjacent to the defect and placed into sterile saline.  Two flaps were raised bilaterally by undermining widely in the subcutaneous plane. Hemostasis was obtained and the flaps were advanced into the defect with care to avoid distortion, and the wound edges were closed in a layered fashion, leaving a smaller defect.  A Burow??????s triangle was defatted and trimmed to fit this remaining defect, and sutured into place circumferentially.
Special Stains Stage 7 - Results: Base On Clearance Noted Above
Scc Poorly Differentiated Histology Text: There were aggregates of poorly-differentiated squamous cells.
V-Y Plasty Text: Due to geometric and functional constraints, a flap reconstruction was performed to reconstruct the defect.  To that end, adjacent tissue was incised and carried over to close the defect in the following manner: Because of the full-thickness nature of the wound and to preserve nearby structures, a V-toY Advancement flap was planned. After prep and local anesthesia, a Burow??????s triangle was excised adjacent to the defect.  Opposite from this, two smaller Burow??????s triangles were excised.  Three flaps were created by undermining in the deep subcutaneous plane. After hemostasis, the flaps were advanced and closed in a layered fashion.
Show Eye Protection Variable: Yes
Bcc Superficial Histology Text: There were aggregates of basaloid cells budding from the epidermis.
Anesthesia Volume In Cc: 2
Asc Procedure Text (E): After obtaining clear surgical margins the patient was sent to an ASC for surgical repair.  The patient understands they will receive post-surgical care and follow-up from the ASC physician.
Unique Flap 2 Text: Because of the full-thickness nature of the defect and to reduce risk for alar rim retraction, and after discussion of options and risk for alar retraction with this repair, a free cartilage graft was planned.  An incision through the anterior antihelix was made and the skin lifted in the periochondrial plane.  A square of cartilage sized to fit defect.
Incidental Actinic Keratosis Histology Text: Incidental AK was noted at the periphery of the Mohs section destruction was performed, pt was was advised to monitor, and/or patient was advised to considered topical 5FU once fully healed.
Fibroepithelioma Of Pinkus Histology Text: There were aggregates of basaloid cells consistent with fibroepithelioma of pinks.
Bcc  Nodular Histology Text: There were large aggregates of basaloid cells.
Same Histology In Subsequent Stages Text: The pattern and morphology of the tumor is as described in the first stage.
Cartilage Fenestration Performed?: No
Estlander Flap (Lower To Upper Lip) Text: The defect of the lower lip was assessed and measured.  Given the location and size of the defect, an Estlander flap was deemed most appropriate.  Using a sterile surgical marker, an appropriate Estlander flap was measured and drawn on the upper lip. Local anesthesia was then infiltrated. A scalpel was then used to incise the lateral aspect of the flap, through skin, muscle and mucosa, leaving the flap pedicled medially.  The flap was then rotated and positioned to fill the lower lip defect.  The flap was then sutured into place with a three layer technique, closing the orbicularis oris muscle layer with subcutaneous buried sutures, followed by a mucosal layer and an epidermal layer.
Scc Moderately Differentiated Histology Text: There were aggregates of moderately-differentiated squamous cells.
Banner Transposition Flap Text: Due to geometric and functional constraints, a flap reconstruction was performed to reconstruct the defect.  To that end, adjacent tissue was incised and carried over to close the defect in the following manner: Because of orientation and full-thickness nature of the defect, a long rhombic transposition flap (banner flap) was planned. After prep and anesthesia, the rhombic flap was carefully incised to straddle relaxed skin tension lines and the anticipated Burow's triangle removed. The flap was raised and the wound edges were all undermined in the subcutaneous plane. After hemostasis, the flap was transposed into the defect and sutured in a layered fashion.
Otolaryngologist Procedure Text (F): After obtaining clear surgical margins the patient was sent to otolaryngology for surgical repair.  The patient understands they will receive post-surgical care and follow-up from the referring physician's office.
Dfsp Histology Text: There were densely arranged spindle-shaped cells.
Area L Indication Text: Tumors in this location are included in Area L (trunk and extremities).  Mohs surgery is indicated for larger tumors, or tumors with aggressive histologic features, in these anatomic locations.  The size of x cm plus 2 mm margin of excision equals tumor size of at least x cm and meets criteria for Mohs surgery for this location according to CPT definition of malignant tumor size that includes margin of excision.  It also meets appropriate use criteria (AUC) for Mohs surgery.
Trichoepithelioma Histology Text: There were aggregates of densely blue cells.
Consent (Scalp)/Introductory Paragraph: The rationale for Mohs was explained to the patient and consent was obtained. The risks, benefits and alternatives to therapy were discussed in detail. Specifically, the risks of changes in hair growth pattern secondary to repair, infection, scarring, bleeding, prolonged wound healing, incomplete removal, allergy to anesthesia, nerve injury and recurrence were addressed. Prior to the procedure, the treatment site was clearly identified and confirmed by the patient. All components of Universal Protocol/PAUSE Rule completed.
Consent (Temporal Branch)/Introductory Paragraph: The rationale for Mohs was explained to the patient and consent was obtained. The risks, benefits and alternatives to therapy were discussed in detail. Specifically, the risks of damage to the temporal branch of the facial nerve, infection, scarring, bleeding, prolonged wound healing, incomplete removal, allergy to anesthesia, and recurrence were addressed. Prior to the procedure, the treatment site was clearly identified and confirmed by the patient. All components of Universal Protocol/PAUSE Rule completed.
Repair Hemostasis (Optional): Electrocoagulation
Hemigard Postcare Instructions: The HEMIGARD strips are to remain completely dry for at least 5-7 days.
Purse String (Simple) Text: In order to control the direction of wound closure, to prevent distortion from wound contraction, and to reduce wound size to facilitate wound care and healing, an intermediate repair was planned.  After prep and anesthesia, and circumferential undermining, subcutaneous and dermal stitches were carefully placed across the wound.
Mucosal Advancement Flap Text: Due to geometric and functional constraints, a flap reconstruction was performed to reconstruct the defect.  To that end, adjacent tissue was incised and carried over to close the defect in the following manner: Because of the full-thickness nature of the wound and in order to restore and maintain function, a mucosal advancement flap was planned. After prep and local anesthesia, Burow??????s triangles were excised adjacent to the defect.  Two flaps were created by undermining in the deep subcutaneous plane over the orbicularis oris. After hemostasis, the flaps were advanced and closed in a layered fashion, with care to maintain vermillion border and oral commissures.
Atypical Spitz Tumor Histology Text: Proliferation of MART-1+ cells.
Secondary Defect Length In Cm (Required For Flaps): 0
Wound Care: Vaseline
O-T Plasty Text: Due to geometric and functional constraints, a flap reconstruction was performed to reconstruct the defect.  To that end, adjacent tissue was incised and carried over to close the defect in the following manner: Because of the full-thickness nature of the defect and location adjacent to important structure(s), a bilateral advancement flap (O to T) was planned. After prep and anesthesia, a Burow's triangle was excised adjacent to the defect.  Incisions were extended from the opposite side of the wound, and smaller Burow??????s triangles at the end of each incision.  Two flaps were created by undermining in the subcutaneous plane. After hemostasis was obtained, the flaps were advanced and sutured in a layered fashion.
Tarsorrhaphy Text: A tarsorrhaphy was performed using Frost sutures.
Trilobed Flap Text: Due to geometric and functional constraints, a flap reconstruction was performed to reconstruct the defect.  To that end, adjacent tissue was incised and carried over to close the defect in the following manner: Because of the size and full-thickness nature of the defect, and to maintain form and function of the nose, and to avoid distortion of the nearby nasal tip and ala, a trilobed transposition flap was planned. After prep and local anesthesia, the trilobe was carefully incised with a Burow's triangle oriented superiorly. The flap was raised and the wound edges were undermined in the submuscular plane to the nasofacial junction. After hemostasis, the flaps were transposed into the defect and sutured in a layered fashion
Plastic Surgeon Procedure Text (F): After obtaining clear surgical margins the patient was sent to plastics for surgical repair.  The patient understands they will receive post-surgical care and follow-up from the referring physician's office.
Eye Protection Verbiage: Before proceeding with the stage, a plastic scleral shield was inserted. The globe was anesthetized with a few drops of 1% lidocaine with 1:100,000 epinephrine. Then, an appropriate sized scleral shield was chosen and coated with lacrilube ointment. The shield was gently inserted and left in place for the duration of each stage. After the stage was completed, the shield was gently removed.
Bcc  Morpheaform/Sclerosing Histology Text: There were aggregates of basaloid cells demonstrating a morpheaform/sclerosing pattern.
Suturegard Intro: Intraoperative tissue expansion was performed, utilizing the SUTUREGARD device, in order to reduce wound tension.
Mercedes Flap Text: Due to geometric and functional constraints, a flap reconstruction was performed to reconstruct the defect.  To that end, adjacent tissue was incised and carried over to close the defect in the following manner: Because of the full-thickness nature of the defect and tightness of surrounding skin, a triple-advancement flap was planned.  After prep and local anesthesia, three Burow??????s triangles were excised adjacent to the defect.  The wound edges were widely undermined to raise three flaps in the deep subcutaneous plane.  Hemostasis was achieved.  The flaps were then advanced into the defect and sutured into place.
Oculoplastic Surgeon Procedure Text (E): After obtaining clear surgical margins the patient was sent to oculoplastics for surgical repair.  The patient understands they will receive post-surgical care and follow-up from the referring physician's office.
Scc Histology Text: There were aggregates of squamous cells.
Why Was The Change Made?: Please Select the Appropriate Response
Melolabial Transposition Flap Text: Due to geometric and functional constraints, a flap reconstruction was performed to reconstruct the defect.  To that end, adjacent tissue was incised and carried over to close the defect in the following manner: In order to maintain form and function of the airway, and to avoid distortion, a nasolabial transposition flap with cheek advancement flap closure at the donor site was planned. After prep and local anesthesia, a Burow's triangle was excised superiorly toward the inner canthus.  An incision was made inferiorly in the nasolabial fold to the lateral commissure of the mouth, then extended superiorly on the medial cheek where a nasolabial flap was elevated by undermining the skin in the subcutaneous plane of the cheek.  The primary defect on the nose was also undermined. The flap was distally thinned, transposed into place, amputated at the tip to fit the defect, and sutured to the nose defect in a layered fashion.  See above for size of this flap.  \\n\\nTo close the donor-site defect on the cheek, a cheek advancement flap was elevated by undermining the skin of the cheek in the subcutaneous plane laterally beyond the lateral canthus and superiorly above the orbital rim. After hemostasis was obtained, the flap was advanced medially and attached with peristeal stitches to the pyriform aperture of the maxilla and to the ascending portion of the maxilla. Remaining wound edges were closed in a layered fashion.  This cheek advancement flap measured 3 x 4 cm.
Consent Type: Consent 1 (Standard)
Bilobed Flap Text: Due to geometric and functional constraints, a flap reconstruction was performed to reconstruct the defect.  To that end, adjacent tissue was incised and carried over to close the defect in the following manner: Because of the size and full-thickness nature of the defect, and to maintain form and function of the nose, and to avoid distortion of the nearby nasal tip and ala, a bilobed transposition flap was planned. After prep and local anesthesia, the bilobe was carefully incised with a Burow's triangle oriented superiorly. The flap was raised and the wound edges were undermined in the submuscular plane to the nasofacial junction. After hemostasis, the flaps were transposed into the defect and sutured in a layered fashion
Number Of Stages: 1
Tumor Depth: Less than 6mm from granular layer and no invasion beyond the subcutaneous fat
O-Z Flap Text: Due to geometric and functional constraints, a flap reconstruction was performed to reconstruct the defect.  To that end, adjacent tissue was incised and carried over to close the defect in the following manner: Because of the full-thickness nature of the defect and location adjacent to important structure(s), a bilateral rotation flap (O to T) was planned. After prep and anesthesia, arcuate incisions were extended from two opposite side of the wound.  Two flaps were created by undermining in the subcutaneous plane. After hemostasis was obtained, the flaps were advanced and sutured in a layered fashion.
Nasalis-Muscle-Based Myocutaneous Island Pedicle Flap Text: Due to geometric and functional constraints, a flap reconstruction was performed to reconstruct the defect.  To that end, adjacent tissue was incised and carried over to close the defect in the following manner: In order to avoid distortion of nearby structures, an island pedicle flap was planned.  After prep and local anesthesia, a triangular incision was made.  The flap was dissected to a muscular subcutaneous pedicle.  The skin surrounding the flap was undermined to allow for closure of the donor-site defect. After hemostasis obtained, the flap was advanced into place and sutured in a layered fashion.
Bcc  Nodulocystic Histology Text: There were aggregates of basaloid cells.
Donor Site Anesthesia Type: same as repair anesthesia
Hatchet Flap Text: Due to geometric and functional constraints, a flap reconstruction was performed to reconstruct the defect.  To that end, adjacent tissue was incised and carried over to close the defect in the following manner: Because of the full-thickness nature of the defect and to avoid deformity of free margin of the ala, and after full discussion of the spectrum of repair options, a dorsal nasal rotation flap was planned.  After prep and anesthesia, a Burow??????s triangle was excised from the lateral portion superiorly on the nasal sidewall towards the inner canthus and an incision extended from the inferior margin of the wound across the nose and sidewall, then extended superiorly along the nasofacial sulcus and medial cheek through the inner canthus to the glabella where a back cut was made to the contralateral inner canthus.  The flap was elevated by skeletonizing the nasal root, dorsum, and sidewalls.  After hemostasis obtained, the flap was rotated into place, trimmed to fit the defect, and sutured in a layered fashion.
Cartilage Graft Text: Because of the laxity of the alar rim resulting from loss of fibrofatty support, and to preserve form and function of the nose, a cartilage graft was planned.  An incision was made into the conchal bowl and a cutaneous flap was elevated. The conchal bowl cartilage was excised and after hemostasis was obtained, the cutaneous flap was replaced and sutured down.  The cartilage graft was then carved to fit the defect.  Two pockets were made medially and laterally in the alar rim, and the cartilage strut was sutured into place with Vicryl suture.
Epidermal Autograft Text: Because of the location and depth of the defect and to facilitate healing, an epidermal autograft was deemed most appropriate.  The epidermal-dermal pinch grafts were then harvested.  The skin grafts were then placed in the primary defect and oriented appropriately. The grafts were secured with vaseline gauze and bandage.
Stage 5: Additional Anesthesia Type: 1% lidocaine with 1:100,000 epinephrine and 408mcg clindamycin/ml and a 1:10 solution of 8.4% sodium bicarbonate
Previous Accession (Optional): [id number]
Advancement-Rotation Flap Text: Due to geometric and functional constraints, a flap reconstruction was performed to reconstruct the defect.  To that end, adjacent tissue was incised and carried over to close the defect in the following manner: In order to maintain form and function of the airway, a spiral rotation flap was planned.  After prep and local anesthesia, an incision was made from the inferior, tangentially parallel to the alar rim, and then extended superiorly across the alar crease, vertically on the nasal sidewall, and laterally toward the cheek, or onto the cheek with a backcut.  The flap was undermined and after hemostasis was obtained, the flap was rotated down into place.  All wound edges were sutured in a layered fashion.
Desmoplastic Trichoepithelioma Histology Text: There were basaloid cell proliferation in an infiltrative sclerosing pattern.
Referred To Oculoplastics For Closure Text (Leave Blank If You Do Not Want): After obtaining clear surgical margins the patient was sent to oculoplastics for surgical repair.  The patient understands they will receive post-surgical care and follow-up from the repairing physician's office.
Provider Procedure Text (B): After obtaining clear surgical margins the defect was repaired by another provider.
Bcc Keratotic Histology Text: There were aggregates of basaloid cells demonstrating a pink keratotic pattern.
Hemigard Intro: Due to skin fragility and wound tension, it was decided to use HEMIGARD adhesive retention suture devices to permit a linear closure. The skin was cleaned and dried for a 6cm distance away from the wound. Excessive hair, if present, was removed to allow for adhesion.
Mid-Level Procedure Text (C): After obtaining clear surgical margins the patient was sent to a mid-level provider for surgical repair.  The patient understands they will receive post-surgical care and follow-up from the mid-level provider.
Information: Selecting Yes will display possible errors in your note based on the variables you have selected. This validation is only offered as a suggestion for you. PLEASE NOTE THAT THE VALIDATION TEXT WILL BE REMOVED WHEN YOU FINALIZE YOUR NOTE. IF YOU WANT TO FAX A PRELIMINARY NOTE YOU WILL NEED TO TOGGLE THIS TO 'NO' IF YOU DO NOT WANT IT IN YOUR FAXED NOTE.
Z Plasty Text: Due to geometric and functional constraints, a flap reconstruction was performed to reconstruct the defect.  To that end, adjacent tissue was incised and carried over to close the defect in the following manner: In order to reduce appearance and discomfort related to the web, a Z-plasty was designed.  Aftert prep and anesthesia, a horizontal incision was made across the web.  A double transposition flap was incised in a Z-plasty fashion.  The wound margins were widely undermined to elevate two flaps of skin.  After hemostasis was obtained, the flaps were transposed into place, and sutured in a a layered fashion.
Melolabial Interpolation Flap Text: Due to geometric and functional constraints, a flap reconstruction was performed to reconstruct the defect.  To that end, adjacent tissue was incised and carried over to close the defect in the following manner: In order to maintain the form and function of the airway, and to maintain the alar groove, a two-stage interpolation axial flap and staged reconstruction was planned for closure.  A telfa template was made of the defect.  This was then used to outline the cheek interpolation flap.  The donor area for the pedicle flap was then anesthetized.  The flap was incised through the skin and subcutaneous tissue down to the layer of the underlying musculature.  The flap was carefully incised within the deep plane to maintain its blood supply. The pedicle was then rotated into position and sutured.  \\n\\nTo close the donor-site defect on the cheek, an advancement flap was created by wide undermining laterally in the subcutaneous plane. After hemostasis was obtained, this flap was advanced medially and sutured in a layered fashion.  The portion of the advancement flap near the pedicle of the interpolation flap was closed with care, so as not to constrict the vasculature of the pedicle.   \\n\\nOnce the flaps were sutured into place, adequate blood supply was confirmed with blanching and refill.  The pedicle was wrapped with xeroform gauze and dressed with telfa and gauze bandage to ensure continued blood supply and protect the attached pedicle.
Alar Island Pedicle Flap Text: Due to geometric and functional constraints, a flap reconstruction was performed to reconstruct the defect.  To that end, adjacent tissue was incised and carried over to close the defect in the following manner: The defect edges were debeveled with a #15 scalpel blade.  Given the location of the defect, shape of the defect and the proximity to the alar rim an island pedicle advancement flap was deemed most appropriate.  Using a sterile surgical marker, an appropriate advancement flap was drawn incorporating the defect, outlining the appropriate donor tissue and placing the expected incisions within the nasal ala running parallel to the alar rim. The area thus outlined was incised with a #15 scalpel blade.  The skin margins were undermined minimally to an appropriate distance in all directions around the primary defect and laterally outward around the island pedicle utilizing iris scissors.  There was minimal undermining beneath the pedicle flap.
Dermal Autograft Text: The defect edges were debeveled with a #15 scalpel blade.  Given the location of the defect, shape of the defect and the proximity to free margins a dermal autograft was deemed most appropriate.  Using a sterile surgical marker, the primary defect shape was transferred to the donor site. The area thus outlined was incised deep to adipose tissue with a #15 scalpel blade.  The harvested graft was then trimmed of adipose and epidermal tissue until only dermis was left.  The skin graft was then placed in the primary defect and oriented appropriately.
Adjacent Tissue Transfer Text: The defect edges were debeveled with a #15 scalpel blade.  Given the location of the defect and the proximity to free margins an adjacent tissue transfer was deemed most appropriate.  Using a sterile surgical marker, an appropriate flap was drawn incorporating the defect and placing the expected incisions within the relaxed skin tension lines where possible.  The area thus outlined was incised deep to adipose tissue with a #15 scalpel blade.  The skin margins were undermined to an appropriate distance in all directions utilizing iris scissors and carried over to close the primary defect.
Mustarde Flap Text: Due to geometric and functional constraints, a flap reconstruction was performed to reconstruct the defect.  To that end, adjacent tissue was incised and carried over to close the defect in the following manner: Because of full-thickness of the defect and to avoid distortion of the lower eyelid and canthus, a Mustarde cheek rotation flap was planned. After prep and anesthesia, a large Burow??????s triangle was excised inferiorly.  An incision was made from the superior portion of the wound over the orbital rim, curving upward onto the temple, then down toward the preauricular crease where another Burow??????s triangle was excised.  The full cheek flap was elevated and the wound edges were undermined in the subcutaneous plane. After hemostasis, the flap was rotated into place with care to preserve lower lid position, trimmed at the tip, suspended with a periosteal stitch from the orbital rim, and sutured in a a layered fashion.
Zygomaticofacial Flap Text: Due to geometric and functional constraints, a flap reconstruction was performed to reconstruct the defect.  To that end, adjacent tissue was incised and carried over to close the defect in the following manner: Because of full-thickness of the defect and to avoid distortion of the lower eyelid, a full cheek rotation flap was planned. After prep and anesthesia, a large Burow??????s triangle was excised inferiorly.  An incision was made from the superior portion of the wound over the orbital rim, curving upward onto the temple, then downward along the preauricular crease and below the ear lobule where another Burow??????s triangle was excised.  The full cheek flap was elevated and the wound edges were undermined in the subcutaneous plane. After hemostasis, the flap was rotated into place, trimmed at the tip, suspended with a periosteal stitch from the orbital rim, and sutured in a a layered fashion.
Brow Lift Text: A midfrontal incision was made medially to the defect to allow access to the tissues just superior to the left eyebrow. Following careful dissection inferiorly in a supraperiosteal plane to the level of the left eyebrow, several 3-0 monocryl sutures were used to resuspend the eyebrow orbicularis oculi muscular unit to the superior frontal bone periosteum. This resulted in an appropriate reapproximation of static eyebrow symmetry and correction of the left brow ptosis.
Cytokeratin Ae1/Ae3 - Negative Histology Text: CK staining demonstrates a normal density and pattern.
Mixed Nodular And Infiltrative Bcc Histology Text: There were aggregates of basaloid cells in a nodular and infiltrative pattern.
Helical Rim Text: The closure involved the helical rim.
Mastoid Interpolation Flap Text: Due to geometric and functional constraints, a flap reconstruction was performed to reconstruct the defect.  To that end, adjacent tissue was incised and carried over to close the defect in the following manner: Due to the full-thickness nature of the wound and to restore structure/function, a decision was made to reconstruct the defect utilizing an interpolation axial flap and a staged reconstruction.  A telfa template was made of the defect.  This telfa template was then used to outline the mastoid interpolation flap.  The donor area for the pedicle flap was then injected with anesthesia.  The flap was excised through the skin and subcutaneous tissue down to the layer of the underlying musculature.  The pedicle flap was carefully excised within this deep plane to maintain its blood supply.  The edges of the donor site were undermined.   The donor site was closed in a primary fashion.  The pedicle was then rotated into position and sutured.  Once the tube was sutured into place, adequate blood supply was confirmed with blanching and refill.  The pedicle was then wrapped with xeroform gauze and dressed appropriately with a telfa and gauze bandage to ensure continued blood supply and protect the attached pedicle.
Mixed Nodular And Micronodular Bcc Histology Text: There were aggregates of basaloid cells in a nodular and micro nodular pattern.
Detail Level: Detailed
Consent (Ear)/Introductory Paragraph: The rationale for Mohs was explained to the patient and consent was obtained. The risks, benefits and alternatives to therapy were discussed in detail. Specifically, the risks of ear deformity, infection, scarring, bleeding, prolonged wound healing, incomplete removal, allergy to anesthesia, nerve injury and recurrence were addressed. Prior to the procedure, the treatment site was clearly identified and confirmed by the patient. All components of Universal Protocol/PAUSE Rule completed.
Closure 4 Information: This tab is for additional flaps and grafts above and beyond our usual structured repairs.  Please note if you enter information here it will not currently bill and you will need to add the billing information manually.
Helical Rim Advancement Flap Text: Due to geometric and functional constraints, a flap reconstruction was performed to reconstruct the defect.  To that end, adjacent tissue was incised and carried over to close the defect in the following manner: Because of the tightness of the surrounding skin, the full-thickness nature of the defect with exposed cartilage, and to avoid deformity, a helical rim advancement flap was planned.  After prep and anesthesia, any protuberant cartilage or cartilage in the way of tissue movement and approximation was excised.  Then, an incision was made in the anterior portion of the ear through the skin to the posterior ear at the level of the perichondrium both superiorly and inferiorly.  Inferiorly, this extended to the lobule where a Burow??????s triangle was excised anteriorly.  The flaps were then  from the ear posteriorly at the level of the perichondrium to the postauricular sulcus.  After hemostasis obtained, the flaps were advanced and closed in a layered fashion
Advancement Flap (Double) Text: Due to geometric and functional constraints, a flap reconstruction was performed to reconstruct the defect.  To that end, adjacent tissue was incised and carried over to close the defect in the following manner: Because of the full-thickness nature of the wound and in order to displace lines around important structures, a Burow??????s advancement flap was planned. After prep and local anesthesia, a Burow??????s triangle was excised adjacent to the defect.  The other Burow's triangle was displaced to hide incisions within skin relaxation lines. Two flaps were created by undermining in the deep subcutaneous plane. After hemostasis, the flaps were advanced and closed in a layered fashion.
Referred To Plastics For Closure Text (Leave Blank If You Do Not Want): After obtaining clear surgical margins the patient was sent to plastics for surgical repair.  The patient understands they will receive post-surgical care and follow-up from the repairing physician's office.
Deep Sutures: 5-0 Monocryl
Cheiloplasty (Less Than 50%) Text: In order to maintain form and function of the lip, and to avoid distortion of the free margin, a lip advancement flap was planned. After rep and local anesthesia, Burow??????s triangles were excised superiorly to the nasal sill and inferiorly around the lip to the labial mucosa.  Two flaps were elevated by undermining the skin laterally in both directions,  the skin and mucosa from the orbicularis muscle.  Any redundant orbicularis oris muscle was removed and complimentary edges of remaining muscle reapposed with a horizontal mattress stitch.  After hemostasis was obtained, the flaps were advanced and closed in a layered fashion.
Bilobed Transposition Flap Text: Due to geometric and functional constraints, a flap reconstruction was performed to reconstruct the defect.  To that end, adjacent tissue was incised and carried over to close the defect in the following manner: Because of the orientation and full-thickness nature of the defect, and in order to avoid distortion of the surrounding structures, a bilobed flap was planned.  After prep and local anesthesia, the flap was then outlined, incised and elevated.  The skin was widely undermined to allow for closure of the donor site defect.  After hemostasis obtained, the flaps were transposed into place and sutured in a layered fashion.
Subsequent Stages Histo Method Verbiage: Using a similar technique to that described above, a thin layer of tissue was removed from all areas where tumor was visible on the previous stage.  The tissue was again oriented, mapped, dyed, and processed as above.
Unna Boot Text: Unna boot was applied to minimize swelling and chance of dehiscence.  The patient was instructed to remove the Unna boot at home after 1 week.
Ear Star Wedge Flap Text: Due to geometric and functional constraints, a flap reconstruction was performed to reconstruct the defect.  To that end, adjacent tissue was incised and carried over to close the defect in the following manner: Because of the tightness of the surrounding skin, the full-thickness nature of the defect with exposed cartilage, and to avoid deformity, a star wedge advancement flap was planed.  After prep and anesthesia, a full-thickness triangular wedge of tissue was excised, as well as two brows triangles on either side of this to reduce cupping deformity. The chondrocutaneous flaps were then advanced and closed in a layered fashion.
Split-Thickness Skin Graft Text: Because of the size and thickness of the defect, and to provide coverage and facilitate healing with minimal contraction, a split-thickness skin graft was planned.  The donor site was marked and the graft harvested by scalpel, Weck blade, or dermatome.  The graft was then trimmed to fit the defect.  It was sutured into place and a bolster dressing applied.
Simple / Intermediate / Complex Repair - Final Wound Length In Cm: 4
Bi-Rhombic Flap Text: Due to geometric and functional constraints, a flap reconstruction was performed to reconstruct the defect.  To that end, adjacent tissue was incised and carried over to close the defect in the following manner: Because of orientation and full-thickness nature of the defect, a rhombic transposition flap was planned. After  prep and anesthesia, two rhombic flaps were carefully incised to straddle relaxed skin tension lines and the anticipated Burow's triangle were removed. The flap was raised and the wound edges were all undermined in the subcutaneous plane. After hemostasis, the flaps were transposed into the defect and sutured in a layered fashion.
Area M Indication Text: Tumors in this location are included in Area M (cheek, forehead, scalp, neck, jawline and pretibial skin).  Mohs surgery is indicated for tumors in these anatomic locations.  The size of x cm plus 2 mm margin of excision equals tumor size of at least x cm and meets criteria for Mohs surgery for this location according to CPT definition of malignant tumor size that includes margin of excision.  It also meets appropriate use criteria (AUC) for Mohs surgery.
O-L Flap Text: Due to geometric and functional constraints, a flap reconstruction was performed to reconstruct the defect.  To that end, adjacent tissue was incised and carried over to close the defect in the following manner: Because of the full-thickness nature of the defect, and to preserve nearby structures and landmarks, a Burow??????s advancement flap (L-plasty) was planned. After prep and anesthesia, a Burow's triangle was excised adjacent to the defect.  Perpendicular to this, a line was incised and crescentic Burow??????s triangle excised.  The flap was elevated by undermining in the subcutaneous plane. Hemostasis was obtained.  The flap was advanced into the defect with care to avoid distortion and was sutured into place in a layered fashion
Repair Type: Intermediate Layered Repair
Scc In Situ Histology Text: There was squamous cell atypia and proliferation in a SCIS pattern.
Epidermal Closure Graft Donor Site (Optional): running
Consent 1/Introductory Paragraph: The rationale for Mohs was explained to the patient and consent was obtained. The risks, benefits and alternatives to therapy were discussed in detail. Specifically, the risks of infection, scarring, bleeding, prolonged wound healing, incomplete removal, allergy to anesthesia, nerve injury and recurrence were addressed. Prior to the procedure, the treatment site was clearly identified and confirmed by the patient. All components of Universal Protocol/PAUSE Rule completed.
Which Eyelid Repair Cpt Are You Using?: 09459
Epidermal Closure: simple interrupted
Repair Anesthesia Method: local infiltration
Consent (Near Eyelid Margin)/Introductory Paragraph: The rationale for Mohs was explained to the patient and consent was obtained. The risks, benefits and alternatives to therapy were discussed in detail. Specifically, the risks of ectropion or eyelid deformity, infection, scarring, bleeding, prolonged wound healing, incomplete removal, allergy to anesthesia, nerve injury and recurrence were addressed. Prior to the procedure, the treatment site was clearly identified and confirmed by the patient. All components of Universal Protocol/PAUSE Rule completed.
Presence Of Scar Tissue (For Histology): absent
Incidental Superficial Basal Cell Carcinoma Histology Text: Incidental superficial BCC was noted at the periphery of the Mohs section.  If clear margins were not obtained, patient was advised to considered 35%TCA followed by topical 5FU once fully healed.
Skin Substitute Text: Because of the size and depth of the defect and to facilitate healing by granulation, a skin substitute graft was planned.  After prep and local anesthesia, Xenograft material was trimmed to fi the defect and secured circumferentially.
H Plasty Text: Due to geometric and functional constraints, a flap reconstruction was performed to reconstruct the defect.  To that end, adjacent tissue was incised and carried over to close the defect in the following manner: Given the location of the defect, shape of the defect and the proximity to free margins a H-plasty was deemed most appropriate for repair.  Using a sterile surgical marker, the appropriate advancement arms of the H-plasty were drawn incorporating the defect and placing the expected incisions within the relaxed skin tension lines where possible. The area thus outlined was incised deep to adipose tissue with a #15 scalpel blade. The skin margins were undermined to an appropriate distance in all directions utilizing iris scissors.  The opposing advancement arms were then advanced into place in opposite direction and anchored with interrupted buried subcutaneous sutures.
Bcc Adenoid Histology Text: There were aggregates of basaloid cells demonstrating an adenoid or cribiform pattern.
Unique Flap 3 Text: Because of the through-and-through defect of the lateral ala, in order to restore the nose and maintain an open airway, a island-pedicle Spear flap was planned with cheek advancement flap.  After prep and anesthesia, a template was made of the right side of the lip and transferred to the left side to outline the normal anatomic position of the triangular portion of the upper lip.  A template was then made of the lining and outer defect of the left ala and transferred to the medial cheek adjacent to the nasolabial fold.  An island-pedicle flap was incised and elevated and carefully dissected to a muscular subcutaneous pedicle based near the piriform aperture and ascending portion of the maxilla.  This was carefully turned over and sutured to line the ala, then turned up on itself where it was sutured in a layered fashion.  \\n\\nTo close the donor site defect, a cheek flap was elevated by undermining in the subcutaneous plane lateral to the lateral canthus  After hemostasis was obtained the flap was advanced medially, attached to the piriform aperture of the axilla and ascending portion of the maxilla, and then closed in a layered fashion.  The advancement flap measured 3 x 4 cm.
Abbe Flap (Lower To Upper Lip) Text: The defect of the upper lip was assessed and measured.  Given the location and size of the defect, an Abbe flap was deemed most appropriate.  Using a sterile surgical marker, an appropriate Abbe flap was measured and drawn on the lower lip. Local anesthesia was then infiltrated. A scalpel was then used to incise the upper lip through and through the skin, vermilion, muscle and mucosa, leaving the flap pedicled on the opposite side.  The flap was then rotated and transferred to the lower lip defect.  The flap was then sutured into place with a three layer technique, closing the orbicularis oris muscle layer with subcutaneous buried sutures, followed by a mucosal layer and an epidermal layer.
Rhomboid Transposition Flap Text: Due to geometric and functional constraints, a flap reconstruction was performed to reconstruct the defect.  To that end, adjacent tissue was incised and carried over to close the defect in the following manner: Because of orientation and full-thickness nature of the defect, a rhombic transposition flap was planned. After prep and anesthesia, the rhombic flap was carefully incised to straddle relaxed skin tension lines and the anticipated Burow's triangle removed. The flap was raised and the wound edges were all undermined in the subcutaneous plane. After hemostasis, the flap was transposed into the defect and sutured in a layered fashion.
Tumor Debulked?: not debulked
Staging Info: By selecting yes to the question above you will include information on AJCC 8 tumor staging in your Mohs note. Information on tumor staging will be automatically added for SCCs on the head and neck. AJCC 8 includes tumor size, tumor depth, perineural involvement and bone invasion.
Unique Flap 2 Name: Free Cartilage graft
Pinch Graft Text: The defect edges were debeveled with a #15 scalpel blade. Given the location of the defect, shape of the defect and the proximity to free margins a pinch graft was deemed most appropriate. Using a sterile surgical marker, the primary defect shape was transferred to the donor site. The area thus outlined was incised deep to adipose tissue with a #15 scalpel blade.  The harvested graft was then trimmed of adipose tissue until only dermis and epidermis was left. The skin margins of the secondary defect were undermined to an appropriate distance in all directions utilizing iris scissors.  The secondary defect was closed with interrupted buried subcutaneous sutures.  The skin edges were then re-apposed with running  sutures.  The skin graft was then placed in the primary defect and oriented appropriately.
Unique Flap 3 Name: Spear Flap
Area H Indication Text: Tumors in this location are included in Area H (eyelids, eyebrows, nose, lips, chin, ear, pre-auricular, post-auricular, temple, genitalia, hands, feet, ankles and areola).  Tissue conservation is critical in these anatomic locations.
Intermediate Repair And Flap Additional Text (Will Appearing After The Standard Complex Repair Text): The intermediate repair was not sufficient to completely close the primary defect. The remaining additional defect was repaired with the flap mentioned below.
Bilateral Rotation Flap Text: The defect edges were debeveled with a #15 scalpel blade. Given the location of the defect, shape of the defect and the proximity to free margins a bilateral rotation flap was deemed most appropriate. Using a sterile surgical marker, an appropriate rotation flap was drawn incorporating the defect and placing the expected incisions within the relaxed skin tension lines where possible. The area thus outlined was incised deep to adipose tissue with a #15 scalpel blade. The skin margins were undermined to an appropriate distance in all directions utilizing iris scissors. Following this, the designed flap was carried over into the primary defect and sutured into place.
W Plasty Text: Due to geometric and functional constraints, a flap reconstruction was performed to reconstruct the defect.  To that end, adjacent tissue was incised and carried over to close the defect in the following manner: The lesion was extirpated to the level of the fat with a #15 scalpel blade.  Given the location of the defect, shape of the defect and the proximity to free margins a W-plasty was deemed most appropriate for repair.  Using a sterile surgical marker, the appropriate transposition arms of the W-plasty were drawn incorporating the defect and placing the expected incisions within the relaxed skin tension lines where possible.    The area thus outlined was incised deep to adipose tissue with a #15 scalpel blade.  The skin margins were undermined to an appropriate distance in all directions utilizing iris scissors.  The opposing transposition arms were then transposed into place in opposite direction and anchored with interrupted buried subcutaneous sutures.
Bilateral Helical Rim Advancement Flap Text: Due to geometric and functional constraints, a flap reconstruction was performed to reconstruct the defect.  To that end, adjacent tissue was incised and carried over to close the defect in the following manner: Because of the tightness of the surrounding skin, the full-thickness nature of the defect with exposed cartilage, and to avoid deformity, a helical rim advancement flap was planed.  After prep and anesthesia, an incision was made in the anterior portion of the ear through the skin and the cartilage to the posterior perichondrium both superiorly and inferiorly within the scapha of the ear.  Inferiorly, this extended to the lobule where a Burow??????s triangle was excised anteriorly.  The chondrocutaneous flaps were then  from the ear posteriorly at the level of the perichondrium to the postauricular sulcus.  A small rim of cartilage was also excised around the contour of the ear both superiorly and inferiorly, and after hemostasis obtained, the chondrocutaneous flaps were advanced and closed in a layered fashion
Where Do You Want The Question To Include Opioid Counseling Located?: Case Summary Tab
Full Thickness Lip Wedge Repair (Flap) Text: In order to maintain form and function of the lip, and to avoid distortion of the free margin, a lip advancement flap was planned. After prep and local anesthesia, Burow??????s triangles were excised superiorly to the nasal sill and inferiorly around the lip to the labial mucosa.  Two flaps were elevated by undermining the skin laterally in both directions,  the skin and mucosa from the orbicularis muscle.  Any redundant orbicularis oris muscle was removed and complimentary edges of remaining muscle reapposed with a horizontal mattress stitch.  After hemostasis was obtained, the flaps were advanced and closed in a layered fashion.
Mohs Rapid Report Verbiage: The area of clinically evident tumor was marked with skin marking ink and appropriately hatched.  The initial incision was made following the Mohs approach through the skin.  The specimen was taken to the lab, divided into the necessary number of pieces, chromacoded and processed according to the Mohs protocol.  This was repeated in successive stages until a tumor free defect was achieved.
Bcc Superficial Pigmented Histology Text: There were aggregates of basaloid cells with pigment budding from the epidermis.
Incidental Squamous Cell Carcinoma In Situ Histology Text: Incidental SCIS was noted at the periphery of the Mohs section.  If clear margins were not obtained, patient was advised to considered 35%TCA followed by topical 5FU once fully healed.
Retention Suture Text: Retention sutures were placed to support the closure and prevent dehiscence.
Estimated Blood Loss (Cc): minimal
Hemigard Retention Suture: 2-0 Nylon
Follicular Atypia Histology Text: Follicular atypia was noted and reviewed with patient, patient was advised to monitor and report any skin changes.
Dressing: pressure dressing with telfa
Consent (Marginal Mandibular)/Introductory Paragraph: The rationale for Mohs was explained to the patient and consent was obtained. The risks, benefits and alternatives to therapy were discussed in detail. Specifically, the risks of damage to the marginal mandibular branch of the facial nerve, infection, scarring, bleeding, prolonged wound healing, incomplete removal, allergy to anesthesia, and recurrence were addressed. Prior to the procedure, the treatment site was clearly identified and confirmed by the patient. All components of Universal Protocol/PAUSE Rule completed.
Island Pedicle Flap With Canthal Suspension Text: Due to geometric and functional constraints, a flap reconstruction was performed to reconstruct the defect.  To that end, adjacent tissue was incised and carried over to close the defect in the following manner: In order to avoid distortion of nearby structures, an island pedicle flap was planned.  After prep and local anesthesia, a triangular incision was made.  The flap was dissected to a muscular subcutaneous pedicle.  The skin surrounding the flap was undermined to allow for closure of the donor-site defect. After hemostasis obtained, the flap was advanced into place and sutured in a layered fashion.   suspension suture was placed in the canthal tendon to prevent tension and prevent ectropion.
Mart-1 - Positive Histology Text: MART-1 staining demonstrates areas of higher density and clustering of melanocytes with Pagetoid spread upwards within the epidermis. The surgical margins are positive for tumor cells.
Unique Flap 1 Name: Nasal Advancement Flap
Consent (Lip)/Introductory Paragraph: The rationale for Mohs was explained to the patient and consent was obtained. The risks, benefits and alternatives to therapy were discussed in detail. Specifically, the risks of lip deformity, changes in the oral aperture, infection, scarring, bleeding, prolonged wound healing, incomplete removal, allergy to anesthesia, nerve injury and recurrence were addressed. Prior to the procedure, the treatment site was clearly identified and confirmed by the patient. All components of Universal Protocol/PAUSE Rule completed.
Wound Care (No Sutures): Mastisol
Mart-1 - Negative Histology Text: MART-1 staining demonstrates a normal density and pattern of melanocytes along the dermal-epidermal junction. The surgical margins are negative for tumor cells.
Depth Of Tumor Invasion (For Histology): tumor not visualized
Consent 3/Introductory Paragraph: I gave the patient a chance to ask questions they had about the procedure.  Following this I explained the Mohs procedure and consent was obtained. The risks, benefits and alternatives to therapy were discussed in detail. Specifically, the risks of infection, scarring, bleeding, prolonged wound healing, incomplete removal, allergy to anesthesia, nerve injury and recurrence were addressed. Prior to the procedure, the treatment site was clearly identified and confirmed by the patient. All components of Universal Protocol/PAUSE Rule completed.
Secondary Intention Text (Leave Blank If You Do Not Want): Due to the superficial nature of the defect and/or patient preference, the wound was allowed to heal by secondary intention.
Muscle Hinge Flap Text: Due to geometric and functional constraints, a flap reconstruction was performed to reconstruct the defect.  To that end, adjacent tissue was incised and carried over to close the defect in the following manner: Due to the deep nature of the defect, and to ensure survival of the overlying flap or graft repair for cutaneous coverage, the wound was first addressed with a muscle hinge flap.  Three sides of the muscle were incised and flipped over like a page of the book into the defect, and secured with Vicryl stitches.
Mohs Method Verbiage: An incision following the standard Mohs approach was done and the specimen was harvested as a microscopic controlled layer.
Vermilion Border Text: The closure involved the vermilion border.
Undermining Type: Entire Wound
Scc Spindle Histology Text: There were aggregates of spindle-like squamous cells.
Retention Suture Bite Size: 3 mm
Surgeon: Mary Rush, DO
Consent (Spinal Accessory)/Introductory Paragraph: The rationale for Mohs was explained to the patient and consent was obtained. The risks, benefits and alternatives to therapy were discussed in detail. Specifically, the risks of damage to the spinal accessory nerve, infection, scarring, bleeding, prolonged wound healing, incomplete removal, allergy to anesthesia, and recurrence were addressed. Prior to the procedure, the treatment site was clearly identified and confirmed by the patient. All components of Universal Protocol/PAUSE Rule completed.
Posterior Auricular Interpolation Flap Text: Due to geometric and functional constraints, a flap reconstruction was performed to reconstruct the defect.  To that end, adjacent tissue was incised and carried over to close the defect in the following manner: Due to location on free margin and exposed cartilage and to restore structure and function, a decision was made to reconstruct the defect utilizing an interpolation axial flap and a staged reconstruction.  A telfa template was made of the defect.  This telfa template was then used to outline the posterior auricular interpolation flap.  The donor area for the pedicle flap was then injected with anesthesia.  The flap was excised through the skin and subcutaneous tissue down to the layer of the underlying musculature.  The pedicle flap was carefully excised within this deep plane to maintain its blood supply.  The edges of the donor site were undermined.   The donor site was closed in a primary fashion.  The pedicle was then rotated into position and sutured.  Once the tube was sutured into place, adequate blood supply was confirmed with blanching and refill.  The pedicle was then wrapped with xeroform gauze and dressed appropriately with a telfa and gauze bandage to ensure continued blood supply and protect the attached pedicle.
Length To Time In Minutes Device Was In Place: 10
A-T Advancement Flap Text: Due to geometric and functional constraints, a flap reconstruction was performed to reconstruct the defect.  To that end, adjacent tissue was incised and carried over to close the defect in the following manner: Because of the full-thickness nature of the defect and location adjacent to important structure(s), a bilateral advancement flap (A to T) was planned. After prep and anesthesia, a Burow's triangle was excised adjacent to the defect.  Incisions were extended from the opposite side of the wound, and smaller Burow??????s triangles at the end of each incision.  Two flaps were created by undermining in the subcutaneous plane. After hemostasis was obtained, the flaps were advanced and sutured in a layered fashion.
Complex Repair And Graft Additional Text (Will Appearing After The Standard Complex Repair Text): The complex repair was not sufficient to completely close the primary defect. The remaining additional defect was repaired with the graft mentioned below.
Star Wedge Flap Text: Due to geometric and functional constraints, a flap reconstruction was performed to reconstruct the defect.  To that end, adjacent tissue was incised and carried over to close the defect in the following manner: Because of the tightness of the surrounding skin, the full-thickness nature of the defect with exposed cartilage, and to avoid deformity, a star wedge advancement flap was planned.  After prep and anesthesia, a full-thickness triangular wedge of tissue was excised, as well as two Burow's triangles on either side of this to reduce cupping deformity. The chondrocutaneous flaps were then advanced and closed in a layered fashion.
Initial Size Of Lesion: 2.5
Composite Graft Text: In order to decrease risk for distortion of the alar rim, a full-thickness skin graft with cartilage graft was planned. After prep and local anesthesia, a template was made of the defect and the graft was harvested from the conchal bowl. After the graft was harvested, a strip of exposed cartilage was also elevated from the conchal bowl.  The cartilage was either inset into pockets on either side of the defect, or minced and placed at the base of the wound.  The skin graft was then defatted and trimmed to fit the defect. It was sutured into place circumferentially and a tie-over Bolster dressing was applied.  Hemostasis was obtained at the donor site which was then bandaged to heal by second intention.
Mohs Histo Method Verbiage: Each section was then chromacoded and processed in the Mohs lab using the Mohs protocol and submitted for frozen section.
Localized Dermabrasion With Wire Brush Text: First, a scalpel was used to shave remove protuberant scar and sebaceous hyperplasia.  Next, sterilized sandpaper was used to physically abrade to papillary dermis. This spot dermabrasion was performed to complete skin cancer reconstruction. It also will eliminate the other sun damaged precancerous cells that are known to be part of the regional effect of a lifetime's worth of sun exposure. This localized dermabrasion is therapeutic and should not be considered cosmetic in any regard.
Eyelid Full Thickness Repair - 36952: The eyelid defect was full thickness which required a wedge repair of the eyelid. Special care was taken to ensure that the eyelid margin was realligned when placing sutures.
Nostril Rim Text: The closure involved the nostril rim.
Postop Diagnosis: same
Intermediate Repair Preamble Text (Leave Blank If You Do Not Want): Undermining was performed with blunt dissection.
Abbe Flap (Upper To Lower Lip) Text: The defect of the lower lip was assessed and measured.  Given the location and size of the defect, an Abbe flap was deemed most appropriate.  Using a sterile surgical marker, an appropriate Abbe flap was measured and drawn on the upper lip. Local anesthesia was then infiltrated.  A scalpel was then used to incise the upper lip through and through the skin, vermilion, muscle and mucosa, leaving the flap pedicled on the opposite side.  The flap was then rotated and transferred to the lower lip defect.  The flap was then sutured into place with a three layer technique, closing the orbicularis oris muscle layer with subcutaneous buried sutures, followed by a mucosal layer and an epidermal layer.
Scc Well Differentiated Histology Text: There were aggregates of well-differentiated squamous cells.
Home Suture Removal Text: Patient was provided instructions on removing sutures and will remove their sutures at home.  If they have any questions or difficulties they will call the office.
Scc Desmoplastic Subtype Histology Text: There were aggregates of squamous cells in a desk-plastic pattern.
Dressing (No Sutures): steri-strips and pressure dressing
Merkel Cell Carcinoma Histology Text: There were aggregates of densely packed small blue cells.
Double O-Z Plasty Text: Due to geometric and functional constraints, a flap reconstruction was performed to reconstruct the defect.  To that end, adjacent tissue was incised and carried over to close the defect in the following manner: Because of the full-thickness nature of the defect and location adjacent to important structure(s), a bilateral rotation flap (double O to Z) was planned. After prep and anesthesia, arcuate incisions were extended from two opposite side of the wound.  Two flaps were created by undermining in the subcutaneous plane. After hemostasis was obtained, the flaps were advanced and sutured in a layered fashion.
Scc In Situ With Follicular Extension Histology Text: There was squamous cell atypia and proliferation in a SCIS pattern, with follicular or adnexal extension.
Nasal Turnover Hinge Flap Text: Due to geometric and functional constraints, a flap reconstruction was performed to reconstruct the defect.  To that end, adjacent tissue was incised and carried over to close the defect in the following manner: Due to the deep nature of the defect, and to restore structure and function, and to cover and ensure survival of underlying tissue, and to provide cutaneous coverage, a cutaneous hinge flap was performed.  Three sides of the full-thickness skin adjacent to the defect were incised and flipped over like a page of the book into the defect, and secured with Vicryl stitches.
Graft Cartilage Fenestration Text: The exposed cartilage was fenestrated with a 3mm punch biopsy to help facilitate wound healing, and decrease infection and chondritis.
Ftsg Text: Because of the full-thickness nature of the defect, to protect underlying structures, and to avoid distortion, a full-thickness skin graft was planned. After prep and local anesthesia, a template was made of the defect and the graft was harvested.  The graft was defatted and trimmed to fit the defect. It was sutured into place circumferentially and a tie-over Bolster dressing was applied.  The donor site was converted to a fusiform defect, and after hemostasis, was closed in a layered fashion.
Metatypical Bcc Histology Text: There were aggregates of basaloid cells in a metatypical pattern.
Anesthesia Volume In Cc: 5
X Size Of Lesion In Cm (Optional): 1.7
Rhombic Flap Text: Due to geometric and functional constraints, a flap reconstruction was performed to reconstruct the defect.  To that end, adjacent tissue was incised and carried over to close the defect in the following manner: Because of the size and full-thickness nature of the defect, and in order to maintain form and function while avoiding distortion of the nearby nasal tip and ala, a rhombic transposition flap was planned. After prep and anesthesia, a Burow's triangle was excised laterally, just superior to the alar groove. The rhombic flap was carefully incised superior to the defect.  The flap was raised and the wound edges were all undermined in the subcutaneous plane. After hemostasis, the flap was transposed into the defect and sutured in a layered fashion.
Unique Flap 1 Text: Because of the full-thickness nature of the defect, and to maintain form and function of the nose, and the proximity to the nasal tip and ala, a bilateral advancement flap was carefully planned. After anesthesia and prep, a Burow's triangle was excised above the defect up to the nasal root, and a Burow??????s triangle displaced centrally and excised below the defect down to the columella.  Two laterally-based flaps were created by undermining at the level of the periosteum and perichondrium skeletonizing the nasal tip, dorsum and sidewalls.  After hemostasis, a tension-reduction stitch was placed at the level of the distal nasal bone, and the flaps were sutured together in a layered fashion.
Post-Care Instructions: I reviewed with the patient in detail post-care instructions. Patient is not to engage in any heavy lifting, exercise, or swimming for the next 14 days. Should the patient develop any fevers, chills, bleeding, severe pain patient will contact the office immediately.
Pain Refusal Text: I offered to prescribe pain medication but the patient refused to take this medication.
Paramedian Forehead Flap Text: Due to geometric and functional constraints, a flap reconstruction was performed to reconstruct the defect.  To that end, adjacent tissue was incised and carried over to close the defect in the following manner: Because of the size and full-thickness nature of the defect, and to maintain form and function of the nose, a forehead flap with bilateral forehead advancement flap closure of the donor site was planned.  After prep and anesthesia, excisional preparation of the recipient site was performed by outlining the cosmetic unit of the nasal tip.  Hemostasis was obtained.  A template was then made of the defect and transferred with the appropriate length to the upper forehead.  The forehead flap was incised and elevated based on the supratrochlear neurovascular bundle.  This was carefully dissected over the orbital rim to the nasion.  It was distally thinned and transferred to the nasal tip.  This was sutured in a layered fashion.  To close the donor site defect on the forehead, a large Burow??????s triangle was excised superiorly and posteriorly into the scalp, and two large flaps were elevated by undermining the entire anterior scalp and forehead to the temples bilaterally, and over the supraorbital rim inferiorly.  After hemostasis was obtained, these flaps were advanced and closed in a layered fashion.
Referred To Otolaryngology For Closure Text (Leave Blank If You Do Not Want): After obtaining clear surgical margins the patient was sent to otolaryngology for surgical repair.  The patient understands they will receive post-surgical care and follow-up from the repairing physician's office.
Orbicularis Oris Muscle Flap Text: Due to geometric and functional constraints, a flap reconstruction was performed to reconstruct the defect.  To that end, adjacent tissue was incised and carried over to close the defect in the following manner: Due to the deep nature of the defect, location near free margin, and to restore oral sphincter function, an orbicularis oris muscle flap was planned.  Using a sterile surgical marker, an appropriate flap was drawn incorporating the defect. The area thus outlined was incised and advanced into place, re-establishing function and closing the wound, and secured with layered stitches.
Scc Sarcomatoid Histology Text: There were aggregates of squamous cells in a sarcomatous pattern.
Peng Advancement Flap Text: Due to geometric and functional constraints, a flap reconstruction was performed to reconstruct the defect.  To that end, adjacent tissue was incised and carried over to close the defect in the following manner: Because of the full-thickness nature of the defect and location adjacent to free margin of alar rims, and to maintain functional airway and alar rim position, a bilateral advancement flap was planned. After prep and anesthesia, incisions were extended from the opposite side of the wound along the alar grooves, and Burow??????s triangles at the end of each incision were excised.  Two flaps were created by undermining in the subcutaneous plane skeletonizing the nasal cartilages. After hemostasis was obtained, the flaps were advanced and sutured in a layered fashion.
Inflammation Suggestive Of Cancer Camouflage Histology Text: There was a dense lymphocytic infiltrate which prevented adequate histologic evaluation of adjacent structures.
Intermediate Repair And Graft Additional Text (Will Appearing After The Standard Complex Repair Text): The intermediate repair was not sufficient to completely close the primary defect. The remaining additional defect was repaired with the graft mentioned below.
Alternatives Discussed Intro (Do Not Add Period): I discussed alternative treatments to Mohs surgery and specifically discussed the risks and benefits of
Xenograft Text: Because of the size and depth of the defect and to facilitate healing by granulation, a tissue-cultured epidermal autograft was planned.  After prep and local anesthesia, Xenograft material was trimmed to fi the defect and secured
Debridement Text: The wound edges were debrided prior to proceeding with the closure to facilitate wound healing.
Tissue Cultured Epidermal Autograft Text: Because of the size and depth of the defect and to facilitate healing by granulation, a tissue-cultured epidermal autograft was planned.  After prep and local anesthesia, Xenograft material was trimmed to fi the defect and secured circumferentially.
Medical Necessity Statement: Based on my medical judgement, Mohs surgery is the most appropriate treatment for this cancer compared to other treatments.
Missing Epidermis Histology Text: Missing tissue was noted on frozen sections and additional slides were cut until present.  If no additional tissue containing epidermis was available, then an additional stage was excised.
Bcc Infiltrative Histology Text: There were aggregates of basaloid cells demonstrating an infiltrative pattern.
Consent (Nose)/Introductory Paragraph: The rationale for Mohs was explained to the patient and consent was obtained. The risks, benefits and alternatives to therapy were discussed in detail. Specifically, the risks of nasal deformity, changes in the flow of air through the nose, infection, scarring, bleeding, prolonged wound healing, incomplete removal, allergy to anesthesia, nerve injury and recurrence were addressed. Prior to the procedure, the treatment site was clearly identified and confirmed by the patient. All components of Universal Protocol/PAUSE Rule completed.
O-Z Plasty Text: Due to geometric and functional constraints, a flap reconstruction was performed to reconstruct the defect.  To that end, adjacent tissue was incised and carried over to close the defect in the following manner: Because of the full-thickness nature of the defect and location adjacent to important structure(s), a bilateral rotation flap (O to Z) was planned. After prep and anesthesia, arcuate incisions were extended from two opposite side of the wound.  Two flaps were created by undermining in the subcutaneous plane. After hemostasis was obtained, the flaps were advanced and sutured in a layered fashion.
Surgeon/Pathologist Verbiage (Will Incorporate Name Of Surgeon From Intro If Not Blank): operated in two distinct and integrated capacities as the surgeon and pathologist.
Manual Repair Warning Statement: We plan on removing the manually selected variable below in favor of our much easier automatic structured text blocks found in the previous tab. We decided to do this to help make the flow better and give you the full power of structured data. Manual selection is never going to be ideal in our platform and I would encourage you to avoid using manual selection from this point on, especially since I will be sunsetting this feature. It is important that you do one of two things with the customized text below. First, you can save all of the text in a word file so you can have it for future reference. Second, transfer the text to the appropriate area in the Library tab. Lastly, if there is a flap or graft type which we do not have you need to let us know right away so I can add it in before the variable is hidden. No need to panic, we plan to give you roughly 6 months to make the change.
Scc Ka Subtype Histology Text: There were aggregates of glassy squamous cells consistent with keratoacanthoma.
Spiral Flap Text: Due to geometric and functional constraints, a flap reconstruction was performed to reconstruct the defect.  To that end, adjacent tissue was incised and carried over to close the defect in the following manner: In order to maintain form and function of the airway, a spiral rotation flap was planned.  After prep and local anesthesia, an incision was made from the inferior wound edge, tangentially parallel to the alar rim, and then extended superiorly across the alar crease, vertically on the nasal sidewall, and laterally toward the cheek, or onto the cheek with a backcut.  The flap was undermined and after hemostasis was obtained, the flap was rotated down into place.  All wound edges were sutured in a layered fashion.
Mauc Instructions: By selecting yes to the question below the MAUC number will be added into the note.  This will be calculated automatically based on the diagnosis chosen, the size entered, the body zone selected (H,M,L) and the specific indications you chose. You will also have the option to override the Mohs AUC if you disagree with the automatically calculated number and this option is found in the Case Summary tab.
Bcc Micronodular Histology Text: There were small aggregates of basaloid cells demonstrating a micro nodular pattern.
Mohs Case Number: [id number]
Staged Advancement Flap Text: Due to geometric and functional constraints, a flap reconstruction was performed to reconstruct the defect.  To that end, adjacent tissue was incised and carried over to close the defect in the following manner: Because of the full-thickness nature of the wound and in order to displace lines around important structures, a Burow??????s advancement flap was planned. After prep and local anesthesia, a Burow??????s triangle was excised adjacent to the defect.  The other Burow's triangle was displaced to hide incisions within skin relaxation lines. Two flaps were created by undermining in the deep subcutaneous plane. After hemostasis, the flaps were advanced and closed in a layered fashion. This is a staged repair and the patient will return for additional surgery.
Epidermal Sutures: 5-0 Fast Absorbing Gut
Rotation Flap Text: Due to geometric and functional constraints, a flap reconstruction was performed to reconstruct the defect.  To that end, adjacent tissue was incised and carried over to close the defect in the following manner: Because of the full-thickness nature of the defect and proximity to vital structures, a rotation flap was planned. After prep and local anesthesia, the flap was carefully incised along an arc.  A Burow's triangle was removed adjacent to the defect and along the outside of the arc. The flap was raised and the wound edges were undermined in the subcutaneous plane. After hemostasis, the flap was rotated into the defect. The distal tip of the flap was trimmed to fit the defect. The entirety of the flap's arcuate border was sutured in a layered fashion
Chonodrocutaneous Helical Advancement Flap Text: Due to geometric and functional constraints, a flap reconstruction was performed to reconstruct the defect.  To that end, adjacent tissue was incised and carried over to close the defect in the following manner: Because of the tightness of the surrounding skin, the full-thickness nature of the defect with exposed cartilage, and to avoid deformity, a helical rim advancement flap was planed.  After prep and anesthesia, an incision was made in the anterior portion of the ear through the skin and the cartilage to the posterior perichondrium both superiorly and inferiorly within the scapha of the ear.  Inferiorly, this extended to the lobule where a Burow??????s triangle was excised anteriorly.  The chondrocutaneous flaps were then  from the ear posteriorly at the level of the perichondrium to the postauricular sulcus.  A small rim of cartilage was also excised around the contour of the ear and after hemostasis obtained, the chondrocutaneous flaps were advanced and closed in a layered fashion
No Residual Tumor Seen Histology Text: There were no malignant cells seen in the sections examined.
Bcc Infundibulocystic Histology Text: There were aggregates of basaloid cells demonstrating an infundibulocystic pattern.
Suturegard Body: The suture ends were repeatedly re-tightened and re-clamped to achieve the desired tissue expansion.
Mixed Superficial And Nodular Bcc Histology Text: There were aggregates of basaloid cells in a superficial and nodular pattern.
Double Z Plasty Text: The lesion was extirpated to the level of the fat with a #15 scalpel blade. Given the location of the defect, shape of the defect and the proximity to free margins a double Z-plasty was deemed most appropriate for repair. Using a sterile surgical marker, the appropriate transposition arms of the double Z-plasty were drawn incorporating the defect and placing the expected incisions within the relaxed skin tension lines where possible. The area thus outlined was incised deep to adipose tissue with a #15 scalpel blade. The skin margins were undermined to an appropriate distance in all directions utilizing iris scissors. The opposing transposition arms were then transposed and carried over into place in opposite direction and anchored with interrupted buried subcutaneous sutures.
No Repair - Repaired With Adjacent Surgical Defect Text (Leave Blank If You Do Not Want): After obtaining clear surgical margins the defect was repaired concurrently with another surgical defect which was in close approximation.
Island Pedicle Flap-Requiring Vessel Identification Text: Due to geometric and functional constraints, a flap reconstruction was performed to reconstruct the defect.  To that end, adjacent tissue was incised and carried over to close the defect in the following manner: Due to the location on free margin and to restore and maintain airway, an alar IPF was planned.  Given the location of the defect, shape of the defect and the proximity to free margins an island pedicle advancement flap was deemed most appropriate.  Using a sterile surgical marker, an appropriate advancement flap was drawn, based on the axial vessel mentioned above, incorporating the defect, outlining the appropriate donor tissue and placing the expected incisions within the relaxed skin tension lines where possible.    The area thus outlined was incised deep to adipose tissue with a #15 scalpel blade.  The skin margins were undermined to an appropriate distance in all directions around the primary defect and laterally outward around the island pedicle utilizing iris scissors.  There was minimal undermining beneath the pedicle flap.
Crescentic Advancement Flap Text: Due to geometric and functional constraints, a flap reconstruction was performed to reconstruct the defect.  To that end, adjacent tissue was incised and carried over to close the defect in the following manner: In order to maintain form and function of the airway, a crescentic advancement flap was planned.  After prep and local anesthesia, a Burow??????s triangle was excised superior to the defect.  A tangential and curvilinear incision was made onto the medial cheek.  Here, a crescentic Burow??????s triangle was excised.  The laterally-based flap was then raised by dissection in the deep subcutaneous plane and the remainder of the wound edges were also undermined.  After hemostasis, the flap was advanced into the defect with a periosteal suture at the base of the flap and the lateral nasal bone.  All wound edges were closed in a layered fashion.
Body Location Override (Optional - Billing Will Still Be Based On Selected Body Map Location If Applicable): left sideburn
Sox10 - Negative Histology Text: SOX10 staining demonstrates a normal density and pattern of melanocytes along the dermal-epidermal junction. The surgical margins are negative for tumor cells.
Keystone Flap Text: Due to geometric and functional constraints, a flap reconstruction was performed to reconstruct the defect.  To that end, adjacent tissue was incised and carried over to close the defect in the following manner: Given the location of the defect, the surrounding tension, to facilitate healing, and the shape of the defect, a keystone flap was planned.  Using a sterile surgical marker, an appropriate keystone flap was drawn incorporating the defect, outlining the appropriate donor tissue and placing the expected incisions within the relaxed skin tension lines where possible. The area thus outlined was incised deep to adipose tissue with a #15 scalpel blade.  The skin margins were undermined to an appropriate distance in all directions around the primary defect and laterally outward around the flap utilizing iris scissors.  The wound edges were sutured in a layered fashion.
Closure 2 Information: This tab is for additional flaps and grafts, including complex repair and grafts and complex repair and flaps. You can also specify a different location for the additional defect, if the location is the same you do not need to select a new one. We will insert the automated text for the repair you select below just as we do for solitary flaps and grafts. Please note that at this time if you select a location with a different insurance zone you will need to override the ICD10 and CPT if appropriate.
Scc Acantholytic Histology Text: There were aggregates of squamous cells in an acantholytic pattern.
Ear Wedge Repair Text: Due to exposed cartilage and to restore structure and function of the ear, a wedge excision was completed by carrying down an excision through the full thickness of the ear and cartilage with an inward facing Burow's triangle. The wound was then closed in a layered fashion.
Consent 2/Introductory Paragraph: Mohs surgery was explained to the patient and consent was obtained. The risks, benefits and alternatives to therapy were discussed in detail. Specifically, the risks of infection, scarring, bleeding, prolonged wound healing, incomplete removal, allergy to anesthesia, nerve injury and recurrence were addressed. Prior to the procedure, the treatment site was clearly identified and confirmed by the patient. All components of Universal Protocol/PAUSE Rule completed.
Double Island Pedicle Flap Text: Due to geometric and functional constraints, a flap reconstruction was performed to reconstruct the defect.  To that end, adjacent tissue was incised and carried over to close the defect in the following manner: The defect edges were debeveled with a #15 scalpel blade.  Given the location of the defect, shape of the defect and the proximity to free margins a double island pedicle advancement flap was deemed most appropriate.  Using a sterile surgical marker, an appropriate advancement flap was drawn incorporating the defect, outlining the appropriate donor tissue and placing the expected incisions within the relaxed skin tension lines where possible.    The area thus outlined was incised deep to adipose tissue with a #15 scalpel blade.  The skin margins were undermined to an appropriate distance in all directions around the primary defect and laterally outward around the island pedicle utilizing iris scissors.  There was minimal undermining beneath the pedicle flap. The wound edges were sutured in a layered fashion.
Area Suspicious For Tumor Histology Text: An area suspicious for additional tumor was noted and excised with additional stage(s).
Afx Histology Text: There were bizarre pleomorphic spindle-shaped cells along with atypical xanthomatized histiocytes
Nasalis Myocutaneous Flap Text: Using a #15 blade, an incision was made around the donor flap to the level of the nasalis muscle. Wide lateral undermining was then performed in both the subcutaneous plane above the nasalis muscle, and in a submuscular plane just above periosteum. This allowed the formation of a free nasalis muscle axial pedicle which was still attached to the actual cutaneous flap, increasing its mobility and vascular viability. Hemostasis was obtained with pinpoint electrocoagulation. The flap was mobilized into position and the pivotal anchor points positioned and stabilized with buried interrupted sutures. Subcutaneous and dermal tissues were closed in a multilayered fashion with sutures. Tissue redundancies were excised, and the epidermal edges were apposed without significant tension and sutured with sutures.
Rectangular Flap Text: The defect edges were debeveled with a #15 scalpel blade. Given the location of the defect and the proximity to free margins a rectangular flap was deemed most appropriate. Using a sterile surgical marker, an appropriate rectangular flap was drawn incorporating the defect. The area thus outlined was incised deep to adipose tissue with a #15 scalpel blade. The skin margins were undermined to an appropriate distance in all directions utilizing iris scissors. Following this, the designed flap was carried over into the primary defect and sutured into place.
Bill 59 Modifier?: No - Continue to Bill 79 Modifier
Localized Dermabrasion With Sand Papertext: The patient was draped in routine manner.  Localized dermabrasion using sterile sand paper was performed in routine manner to papillary dermis. This spot dermabrasion is being performed to complete skin cancer reconstruction. It also will eliminate the other sun damaged precancerous cells that are known to be part of the regional effect of a lifetime's worth of sun exposure. This localized dermabrasion is therapeutic and should not be considered cosmetic in any regard.
Localized Dermabrasion With 15 Blade Text: The patient was draped in routine manner.  Localized dermabrasion using a 15 blade was performed in routine manner to papillary dermis. This spot dermabrasion is being performed to complete skin cancer reconstruction. It also will eliminate the other sun damaged precancerous cells that are known to be part of the regional effect of a lifetime's worth of sun exposure. This localized dermabrasion is therapeutic and should not be considered cosmetic in any regard.
Eyelid Partial Thickness Repair - 78308: The eyelid defect was partial thickness which required a wedge repair of the eyelid. Special care was taken to ensure that the eyelid margin was realligned when placing sutures.
Which Instrument Did You Use For Dermabrasion?: Wire Brush

## 2024-05-28 NOTE — PROCEDURE: SURGICAL DECISION MAKING
Date Of Surgery - Today Or Tomorrow?: today
Initial Decision For Surgery?: Yes
Risk Assessment Explanation (Does Not Render In The Note): Clinical determination of the probability and/or consequences of an event, such as surgery. Clinical assessment of the level of risk is affected by the nature of the event under consideration for the patient. Modifier 57 is used to indicate an Evaluation and Management (E/M) service resulted in the initial decision to perform surgery either the day before a major surgery (90 day global) or the day of a major surgery.
Complexity (Necessary For Coding; Major - 90 Day Global With Some Exceptions; Minor - 10 Day Global): major
Discussion: After the Mohs procedure was complete, a separate and distinct evaluation and management was performed for the resultant surgical defect for potential reconstruction using flap or graft.  Complications and risk of morbidity of each were discussed including (but not limited to) scarring, which could distort a free anatomic margin of the eyelid, nose, ear or lip, thereby negatively affecting body function of these structures.

## 2024-07-17 ENCOUNTER — APPOINTMENT (OUTPATIENT)
Dept: URBAN - NONMETROPOLITAN AREA SURGERY 7 | Age: 84
Setting detail: DERMATOLOGY
End: 2024-07-17

## 2024-07-17 DIAGNOSIS — L57.0 ACTINIC KERATOSIS: ICD-10-CM

## 2024-07-17 PROCEDURE — OTHER PDT: BLUE: OTHER

## 2024-07-17 PROCEDURE — 96574 DBRDMT PRMLG LES W/PDT: CPT

## 2024-07-17 PROCEDURE — OTHER MIPS QUALITY: OTHER

## 2024-07-17 PROCEDURE — OTHER COUNSELING: OTHER

## 2024-07-17 ASSESSMENT — LOCATION DETAILED DESCRIPTION DERM
LOCATION DETAILED: MID-FRONTAL SCALP
LOCATION DETAILED: LEFT SUPERIOR PARIETAL SCALP

## 2024-07-17 ASSESSMENT — LOCATION SIMPLE DESCRIPTION DERM
LOCATION SIMPLE: SCALP
LOCATION SIMPLE: ANTERIOR SCALP

## 2024-07-17 ASSESSMENT — LOCATION ZONE DERM: LOCATION ZONE: SCALP

## 2024-07-17 NOTE — PROCEDURE: PDT: BLUE
Ndc# (Optional): [id number]
Who Performed The Pdt (Provider): Mary larson do
Detail Level: Zone
Debridement Text (Will Only Render In Visit Note If You Select Debridement Option Under Who Performed The Pdt Field): Prior to application of the photodynamic medication the hyperkeratotic lesions were curetted to make them more amenable to therapy.
Who Performed The Pdt?: Performed by MD YARA, JENIFFER or CJ with Pre-Procedure Debridement of Hyperkeratotic Lesions (51906)
Lot # (Optional): [id number]
Medical Necessity: Precancerous Lesions
Treatment Number: 1
Which Photosensitizer Was Used: Levulan
Show Medical Necessity In Plan?: Yes
Anesthesia Type: normal saline
Post-Care Instructions: I reviewed with the patient in detail post-care instructions. Patient is to avoid sunlight for the next 2 days, and wear sun protection. Patients may expect sunburn like redness, discomfort and scabbing.
Pre-Procedure Text: The treatment areas were cleaned and prepped in the usual fashion.
Illumination Time: 00:16:40
Incubation Time: 01:30
Consent: Written consent obtained.  The risks were reviewed with the patient including but not limited to: pigmentary changes, pain, blistering, scabbing, redness, and the remote possibility of scarring.
Was Levulan/Ameluz Applied On A Previous Day?: No
Expiration Date (Optional): 2/27
Light Source: Christopher-U
Show Inventory Tab: Hide

## 2024-08-21 ENCOUNTER — APPOINTMENT (OUTPATIENT)
Dept: URBAN - NONMETROPOLITAN AREA SURGERY 7 | Age: 84
Setting detail: DERMATOLOGY
End: 2024-08-21

## 2024-08-21 DIAGNOSIS — L57.0 ACTINIC KERATOSIS: ICD-10-CM

## 2024-08-21 PROCEDURE — OTHER ORDER FOR PHOTODYNAMIC THERAPY: OTHER

## 2024-08-21 PROCEDURE — 96574 DBRDMT PRMLG LES W/PDT: CPT

## 2024-08-21 PROCEDURE — OTHER COUNSELING: OTHER

## 2024-08-21 PROCEDURE — OTHER MIPS QUALITY: OTHER

## 2024-08-21 PROCEDURE — OTHER PDT: BLUE: OTHER

## 2024-08-21 ASSESSMENT — LOCATION SIMPLE DESCRIPTION DERM
LOCATION SIMPLE: POSTERIOR SCALP
LOCATION SIMPLE: SCALP

## 2024-08-21 ASSESSMENT — LOCATION ZONE DERM: LOCATION ZONE: SCALP

## 2024-08-21 ASSESSMENT — LOCATION DETAILED DESCRIPTION DERM
LOCATION DETAILED: RIGHT SUPERIOR PARIETAL SCALP
LOCATION DETAILED: POSTERIOR MID-PARIETAL SCALP

## 2024-08-21 NOTE — PROCEDURE: ORDER FOR PHOTODYNAMIC THERAPY
Location: Scalp
Occlusion: No
Scalp Incubation Time: 2 hours
Detail Level: Simple
Face And Neck Incubation Time: 1 Hour
Pdt Type: SISI-U
Frequency Of Pdt: Two treatments 4 weeks apart
Face And Scalp Incubation Time: 1 Hour for the face and 2 Hours for the scalp
Debridement: Yes
Face Incubation Time: 2 Hour
Photosensitizer: Levulan
Consent: The procedure and risks were reviewed with the patient including but not limited to: burning, pigmentary changes, pain, blistering, scabbing, redness, and the possibility of needing numerous treatments. Strict photoprotection after the procedure was also discussed.

## 2024-08-21 NOTE — PROCEDURE: PDT: BLUE
Total Number Of Aks Treated (Optional To Report): 0
Eye Protection Applied?: Yes
Incubation Start Time: 10:00
Number Of Kerasticks/Tubes Billed For: 1
Who Performed The Pdt?: Performed by MD YARA, JENIFFER or CJ with Pre-Procedure Debridement of Hyperkeratotic Lesions (52041)
Detail Level: Zone
Incubation End Time: 12:00
Lot # (Optional): [id number]
Which Photosensitizer Was Used: Levulan
Illumination Time: 00:16:40
Pre-Procedure Text: The treatment areas were cleaned and prepped in the usual fashion.
Treatment Number: 2
Debridement Text (Will Only Render In Visit Note If You Select Debridement Option Under Who Performed The Pdt Field): Prior to application of the photodynamic medication the hyperkeratotic lesions were curetted to make them more amenable to therapy.
Medical Necessity: Precancerous Lesions
Show Inventory Tab: Hide
Expiration Date (Optional): 2/27
Show Anesthesia In Plan?: No
Light Source: Christopher-U
Anesthesia Type: 1% lidocaine with epinephrine
Incubation Time: 02:00:00
Post-Care Instructions: I reviewed with the patient in detail post-care instructions. Patient is to avoid sunlight for the next 2 days, and wear sun protection. Patients may expect sunburn like redness, discomfort and scabbing.
Consent: Written consent obtained.  The risks were reviewed with the patient including but not limited to: pigmentary changes, pain, blistering, scabbing, redness, and the remote possibility of scarring.
Who Performed The Pdt (Provider): Mary Rush, DO
Ndc# (Optional): [phone]

## 2024-10-03 NOTE — PROCEDURE: BIOPSY BY SHAVE METHOD
[Normal Growth] : growth [Normal Development] : development [None] : No known medical problems [No Elimination Concerns] : elimination [No Feeding Concerns] : feeding [No Skin Concerns] : skin [Normal Sleep Pattern] : sleep [School Readiness] : school readiness [Mental Health] : mental health [Nutrition and Physical Activity] : nutrition and physical activity [Oral Health] : oral health [Safety] : safety [No Medications] : ~He/She~ is not on any medications [Patient] : patient [FreeTextEntry1] : 6 year female here for well-visit, appropriate growth and development observed. Continue nutritious, balanced diet with all food groups. Brush teeth twice a day with toothbrush. Recommend visit to dentist. Help child to maintain consistent daily routines and sleep schedule. School discussed. Ensure home is safe. Teach child about personal safety. Use consistent, positive discipline. Limit screen time to less than 2 hours per day. Encourage physical activity: at least 1 hour of active play should be encouraged daily. Child needs to ride in a belt-positioning booster seat until  4 feet 9 inches has been reached and are between 8 and 12 years of age.   Return 1 year for routine well child check.    Passed hearing test in office today.   Mom defers flu vaccine. Type Of Destruction Used: Curettage

## 2024-11-06 ENCOUNTER — APPOINTMENT (OUTPATIENT)
Dept: URBAN - NONMETROPOLITAN AREA SURGERY 7 | Age: 84
Setting detail: DERMATOLOGY
End: 2024-11-07

## 2024-11-06 DIAGNOSIS — D18.0 HEMANGIOMA: ICD-10-CM

## 2024-11-06 DIAGNOSIS — D22 MELANOCYTIC NEVI: ICD-10-CM

## 2024-11-06 DIAGNOSIS — L82.1 OTHER SEBORRHEIC KERATOSIS: ICD-10-CM

## 2024-11-06 DIAGNOSIS — L82.0 INFLAMED SEBORRHEIC KERATOSIS: ICD-10-CM

## 2024-11-06 DIAGNOSIS — L81.4 OTHER MELANIN HYPERPIGMENTATION: ICD-10-CM

## 2024-11-06 DIAGNOSIS — L57.0 ACTINIC KERATOSIS: ICD-10-CM

## 2024-11-06 DIAGNOSIS — Z86.007 PERSONAL HISTORY OF IN-SITU NEOPLASM OF SKIN: ICD-10-CM

## 2024-11-06 DIAGNOSIS — Z85.828 PERSONAL HISTORY OF OTHER MALIGNANT NEOPLASM OF SKIN: ICD-10-CM

## 2024-11-06 PROBLEM — D22.5 MELANOCYTIC NEVI OF TRUNK: Status: ACTIVE | Noted: 2024-11-06

## 2024-11-06 PROBLEM — D18.01 HEMANGIOMA OF SKIN AND SUBCUTANEOUS TISSUE: Status: ACTIVE | Noted: 2024-11-06

## 2024-11-06 PROCEDURE — 17000 DESTRUCT PREMALG LESION: CPT | Mod: 59

## 2024-11-06 PROCEDURE — 17110 DESTRUCT B9 LESION 1-14: CPT

## 2024-11-06 PROCEDURE — OTHER LIQUID NITROGEN: OTHER

## 2024-11-06 PROCEDURE — 17003 DESTRUCT PREMALG LES 2-14: CPT | Mod: 59

## 2024-11-06 PROCEDURE — OTHER COUNSELING: OTHER

## 2024-11-06 PROCEDURE — 99213 OFFICE O/P EST LOW 20 MIN: CPT | Mod: 25

## 2024-11-06 PROCEDURE — OTHER MIPS QUALITY: OTHER

## 2024-11-06 ASSESSMENT — LOCATION SIMPLE DESCRIPTION DERM
LOCATION SIMPLE: LEFT EAR
LOCATION SIMPLE: RIGHT UPPER BACK
LOCATION SIMPLE: ABDOMEN
LOCATION SIMPLE: LEFT HAND
LOCATION SIMPLE: RIGHT LOWER BACK
LOCATION SIMPLE: LEFT CHEEK
LOCATION SIMPLE: CHEST
LOCATION SIMPLE: RIGHT HAND
LOCATION SIMPLE: SCALP

## 2024-11-06 ASSESSMENT — LOCATION ZONE DERM
LOCATION ZONE: SCALP
LOCATION ZONE: TRUNK
LOCATION ZONE: HAND
LOCATION ZONE: EAR
LOCATION ZONE: FACE

## 2024-11-06 ASSESSMENT — LOCATION DETAILED DESCRIPTION DERM
LOCATION DETAILED: LEFT MEDIAL SUPERIOR CHEST
LOCATION DETAILED: LEFT SUPERIOR PREAURICULAR CHEEK
LOCATION DETAILED: RIGHT RADIAL DORSAL HAND
LOCATION DETAILED: LEFT SUPERIOR HELIX
LOCATION DETAILED: 2ND WEB SPACE RIGHT HAND
LOCATION DETAILED: STERNUM
LOCATION DETAILED: RIGHT LATERAL ABDOMEN
LOCATION DETAILED: RIGHT ULNAR DORSAL HAND
LOCATION DETAILED: LEFT RADIAL DORSAL HAND
LOCATION DETAILED: RIGHT SUPERIOR UPPER BACK
LOCATION DETAILED: 1ST WEB SPACE LEFT HAND
LOCATION DETAILED: LEFT SUPERIOR PARIETAL SCALP
LOCATION DETAILED: RIGHT INFERIOR LATERAL MIDBACK

## 2024-11-06 NOTE — PROCEDURE: MIPS QUALITY
Quality 130: Documentation Of Current Medications In The Medical Record: Current Medications Documented
Detail Level: Detailed
[de-identified] : 69 yr old female failed screening audio AD at her internist 7/2022  aware of some loss AD.\par +asymmetrical SNHL AD worse\par ABR WNL 8/2022\par -tinnitus, dizzy\par \par +hx otitis, FH NIHL\par -noise exp, head trauma\par \par 
Quality 47: Advance Care Plan: Advance Care Planning discussed and documented in the medical record; patient did not wish or was not able to name a surrogate decision maker or provide an advance care plan.
Quality 226: Preventive Care And Screening: Tobacco Use: Screening And Cessation Intervention: Patient screened for tobacco use and is an ex/non-smoker

## 2024-11-06 NOTE — PROCEDURE: LIQUID NITROGEN
Number Of Freeze-Thaw Cycles: 1 freeze-thaw cycle
Duration Of Freeze Thaw-Cycle (Seconds): 0
Post-Care Instructions: I reviewed with the patient in detail post-care instructions. Patient is to wear sunprotection, and avoid picking at any of the treated lesions. Pt may apply Vaseline to crusted or scabbing areas.
Application Tool (Optional): Cry-AC
Render Note In Bullet Format When Appropriate: No
Show Applicator Variable?: Yes
Consent: The patient's consent was obtained including but not limited to risks of crusting, scabbing, blistering, scarring, darker or lighter pigmentary change, recurrence, incomplete removal and infection.
Detail Level: Detailed
Medical Necessity Clause: This procedure was medically necessary because the lesions that were treated were:
Medical Necessity Information: It is in your best interest to select a reason for this procedure from the list below. All of these items fulfill various CMS LCD requirements except the new and changing color options.
Number Of Freeze-Thaw Cycles: 2 freeze-thaw cycles
Spray Paint Text: The liquid nitrogen was applied to the skin utilizing a spray paint frosting technique.